# Patient Record
Sex: FEMALE | Race: ASIAN | NOT HISPANIC OR LATINO
[De-identification: names, ages, dates, MRNs, and addresses within clinical notes are randomized per-mention and may not be internally consistent; named-entity substitution may affect disease eponyms.]

---

## 2023-06-17 ENCOUNTER — TRANSCRIPTION ENCOUNTER (OUTPATIENT)
Age: 18
End: 2023-06-17

## 2023-06-18 ENCOUNTER — INPATIENT (INPATIENT)
Facility: HOSPITAL | Age: 18
LOS: 3 days | Discharge: HOME | End: 2023-06-22
Attending: SPECIALIST | Admitting: SPECIALIST
Payer: COMMERCIAL

## 2023-06-18 ENCOUNTER — TRANSCRIPTION ENCOUNTER (OUTPATIENT)
Age: 18
End: 2023-06-18

## 2023-06-18 ENCOUNTER — EMERGENCY (EMERGENCY)
Facility: HOSPITAL | Age: 18
LOS: 1 days | Discharge: STILL ADMITTED OR ADMINISTRATIVE | End: 2023-06-18
Admitting: EMERGENCY MEDICINE
Payer: MEDICAID

## 2023-06-18 VITALS
RESPIRATION RATE: 17 BRPM | HEART RATE: 80 BPM | OXYGEN SATURATION: 100 % | DIASTOLIC BLOOD PRESSURE: 55 MMHG | SYSTOLIC BLOOD PRESSURE: 102 MMHG

## 2023-06-18 VITALS — SYSTOLIC BLOOD PRESSURE: 114 MMHG | HEART RATE: 99 BPM | DIASTOLIC BLOOD PRESSURE: 71 MMHG

## 2023-06-18 DIAGNOSIS — O26.899 OTHER SPECIFIED PREGNANCY RELATED CONDITIONS, UNSPECIFIED TRIMESTER: ICD-10-CM

## 2023-06-18 LAB
ALBUMIN SERPL ELPH-MCNC: 3.8 G/DL — SIGNIFICANT CHANGE UP (ref 3.3–5)
ALP SERPL-CCNC: 99 U/L — SIGNIFICANT CHANGE UP (ref 40–120)
ALT FLD-CCNC: 10 U/L — SIGNIFICANT CHANGE UP (ref 4–33)
ANION GAP SERPL CALC-SCNC: 12 MMOL/L — SIGNIFICANT CHANGE UP (ref 7–14)
APTT BLD: 28.6 SEC — SIGNIFICANT CHANGE UP (ref 27–36.3)
AST SERPL-CCNC: 13 U/L — SIGNIFICANT CHANGE UP (ref 4–32)
BASOPHILS # BLD AUTO: 0.07 K/UL — SIGNIFICANT CHANGE UP (ref 0–0.2)
BASOPHILS NFR BLD AUTO: 0.5 % — SIGNIFICANT CHANGE UP (ref 0–2)
BILIRUB SERPL-MCNC: <0.2 MG/DL — SIGNIFICANT CHANGE UP (ref 0.2–1.2)
BLD GP AB SCN SERPL QL: NEGATIVE — SIGNIFICANT CHANGE UP
BUN SERPL-MCNC: 4 MG/DL — LOW (ref 7–23)
CALCIUM SERPL-MCNC: 9.1 MG/DL — SIGNIFICANT CHANGE UP (ref 8.4–10.5)
CHLORIDE SERPL-SCNC: 102 MMOL/L — SIGNIFICANT CHANGE UP (ref 98–107)
CO2 SERPL-SCNC: 21 MMOL/L — LOW (ref 22–31)
CREAT SERPL-MCNC: 0.53 MG/DL — SIGNIFICANT CHANGE UP (ref 0.5–1.3)
EGFR: 136 ML/MIN/1.73M2 — SIGNIFICANT CHANGE UP
EOSINOPHIL # BLD AUTO: 0.1 K/UL — SIGNIFICANT CHANGE UP (ref 0–0.5)
EOSINOPHIL NFR BLD AUTO: 0.7 % — SIGNIFICANT CHANGE UP (ref 0–6)
FIBRINOGEN PPP-MCNC: 806 MG/DL — HIGH (ref 200–465)
GLUCOSE SERPL-MCNC: 99 MG/DL — SIGNIFICANT CHANGE UP (ref 70–99)
HCT VFR BLD CALC: 36.2 % — SIGNIFICANT CHANGE UP (ref 34.5–45)
HGB BLD-MCNC: 11.8 G/DL — SIGNIFICANT CHANGE UP (ref 11.5–15.5)
IANC: 10.33 K/UL — HIGH (ref 1.8–7.4)
IMM GRANULOCYTES NFR BLD AUTO: 1.4 % — HIGH (ref 0–0.9)
INR BLD: 0.94 RATIO — SIGNIFICANT CHANGE UP (ref 0.88–1.16)
LDH SERPL L TO P-CCNC: 136 U/L — SIGNIFICANT CHANGE UP (ref 135–225)
LYMPHOCYTES # BLD AUTO: 16 % — SIGNIFICANT CHANGE UP (ref 13–44)
LYMPHOCYTES # BLD AUTO: 2.23 K/UL — SIGNIFICANT CHANGE UP (ref 1–3.3)
MCHC RBC-ENTMCNC: 25.3 PG — LOW (ref 27–34)
MCHC RBC-ENTMCNC: 32.6 GM/DL — SIGNIFICANT CHANGE UP (ref 32–36)
MCV RBC AUTO: 77.7 FL — LOW (ref 80–100)
MONOCYTES # BLD AUTO: 1 K/UL — HIGH (ref 0–0.9)
MONOCYTES NFR BLD AUTO: 7.2 % — SIGNIFICANT CHANGE UP (ref 2–14)
NEUTROPHILS # BLD AUTO: 10.33 K/UL — HIGH (ref 1.8–7.4)
NEUTROPHILS NFR BLD AUTO: 74.2 % — SIGNIFICANT CHANGE UP (ref 43–77)
NRBC # BLD: 0 /100 WBCS — SIGNIFICANT CHANGE UP (ref 0–0)
NRBC # FLD: 0 K/UL — SIGNIFICANT CHANGE UP (ref 0–0)
PLATELET # BLD AUTO: 269 K/UL — SIGNIFICANT CHANGE UP (ref 150–400)
POTASSIUM SERPL-MCNC: 3.2 MMOL/L — LOW (ref 3.5–5.3)
POTASSIUM SERPL-SCNC: 3.2 MMOL/L — LOW (ref 3.5–5.3)
PROT SERPL-MCNC: 7.6 G/DL — SIGNIFICANT CHANGE UP (ref 6–8.3)
PROTHROM AB SERPL-ACNC: 10.9 SEC — SIGNIFICANT CHANGE UP (ref 10.5–13.4)
RBC # BLD: 4.66 M/UL — SIGNIFICANT CHANGE UP (ref 3.8–5.2)
RBC # FLD: 14.2 % — SIGNIFICANT CHANGE UP (ref 10.3–14.5)
RH IG SCN BLD-IMP: NEGATIVE — SIGNIFICANT CHANGE UP
RH IG SCN BLD-IMP: NEGATIVE — SIGNIFICANT CHANGE UP
SODIUM SERPL-SCNC: 135 MMOL/L — SIGNIFICANT CHANGE UP (ref 135–145)
URATE SERPL-MCNC: 3 MG/DL — SIGNIFICANT CHANGE UP (ref 2.5–7)
WBC # BLD: 13.92 K/UL — HIGH (ref 3.8–10.5)
WBC # FLD AUTO: 13.92 K/UL — HIGH (ref 3.8–10.5)

## 2023-06-18 PROCEDURE — L9996: CPT

## 2023-06-18 PROCEDURE — 49320 DIAG LAPARO SEPARATE PROC: CPT

## 2023-06-18 PROCEDURE — 76856 US EXAM PELVIC COMPLETE: CPT | Mod: 26

## 2023-06-18 PROCEDURE — 76770 US EXAM ABDO BACK WALL COMP: CPT | Mod: 26

## 2023-06-18 PROCEDURE — 76705 ECHO EXAM OF ABDOMEN: CPT | Mod: 26

## 2023-06-18 PROCEDURE — 99223 1ST HOSP IP/OBS HIGH 75: CPT | Mod: GC

## 2023-06-18 PROCEDURE — 99223 1ST HOSP IP/OBS HIGH 75: CPT

## 2023-06-18 RX ORDER — HYDROMORPHONE HYDROCHLORIDE 2 MG/ML
0.5 INJECTION INTRAMUSCULAR; INTRAVENOUS; SUBCUTANEOUS
Refills: 0 | Status: DISCONTINUED | OUTPATIENT
Start: 2023-06-18 | End: 2023-06-18

## 2023-06-18 RX ORDER — SODIUM CHLORIDE 9 MG/ML
1000 INJECTION, SOLUTION INTRAVENOUS
Refills: 0 | Status: DISCONTINUED | OUTPATIENT
Start: 2023-06-18 | End: 2023-06-18

## 2023-06-18 RX ORDER — ACETAMINOPHEN 500 MG
1000 TABLET ORAL EVERY 6 HOURS
Refills: 0 | Status: DISCONTINUED | OUTPATIENT
Start: 2023-06-18 | End: 2023-06-19

## 2023-06-18 RX ORDER — MORPHINE SULFATE 50 MG/1
2 CAPSULE, EXTENDED RELEASE ORAL ONCE
Refills: 0 | Status: DISCONTINUED | OUTPATIENT
Start: 2023-06-18 | End: 2023-06-18

## 2023-06-18 RX ORDER — OXYTOCIN 10 UNIT/ML
333.33 VIAL (ML) INJECTION
Qty: 20 | Refills: 0 | Status: DISCONTINUED | OUTPATIENT
Start: 2023-06-18 | End: 2023-06-18

## 2023-06-18 RX ORDER — ACETAMINOPHEN 500 MG
1000 TABLET ORAL ONCE
Refills: 0 | Status: DISCONTINUED | OUTPATIENT
Start: 2023-06-19 | End: 2023-06-19

## 2023-06-18 RX ORDER — ACETAMINOPHEN 500 MG
1000 TABLET ORAL ONCE
Refills: 0 | Status: DISCONTINUED | OUTPATIENT
Start: 2023-06-18 | End: 2023-06-18

## 2023-06-18 RX ORDER — ONDANSETRON 8 MG/1
4 TABLET, FILM COATED ORAL ONCE
Refills: 0 | Status: DISCONTINUED | OUTPATIENT
Start: 2023-06-18 | End: 2023-06-18

## 2023-06-18 RX ORDER — PIPERACILLIN AND TAZOBACTAM 4; .5 G/20ML; G/20ML
3.38 INJECTION, POWDER, LYOPHILIZED, FOR SOLUTION INTRAVENOUS EVERY 8 HOURS
Refills: 0 | Status: DISCONTINUED | OUTPATIENT
Start: 2023-06-18 | End: 2023-06-22

## 2023-06-18 RX ORDER — ACETAMINOPHEN 500 MG
1000 TABLET ORAL ONCE
Refills: 0 | Status: COMPLETED | OUTPATIENT
Start: 2023-06-18 | End: 2023-06-18

## 2023-06-18 RX ORDER — CHLORHEXIDINE GLUCONATE 213 G/1000ML
1 SOLUTION TOPICAL DAILY
Refills: 0 | Status: DISCONTINUED | OUTPATIENT
Start: 2023-06-18 | End: 2023-06-18

## 2023-06-18 RX ORDER — AZITHROMYCIN 500 MG/1
500 TABLET, FILM COATED ORAL ONCE
Refills: 0 | Status: COMPLETED | OUTPATIENT
Start: 2023-06-18 | End: 2023-06-18

## 2023-06-18 RX ORDER — ACETAMINOPHEN 500 MG
650 TABLET ORAL ONCE
Refills: 0 | Status: COMPLETED | OUTPATIENT
Start: 2023-06-18 | End: 2023-06-18

## 2023-06-18 RX ORDER — SODIUM CHLORIDE 9 MG/ML
1000 INJECTION, SOLUTION INTRAVENOUS
Refills: 0 | Status: DISCONTINUED | OUTPATIENT
Start: 2023-06-18 | End: 2023-06-21

## 2023-06-18 RX ORDER — MORPHINE SULFATE 50 MG/1
4 CAPSULE, EXTENDED RELEASE ORAL ONCE
Refills: 0 | Status: DISCONTINUED | OUTPATIENT
Start: 2023-06-18 | End: 2023-06-18

## 2023-06-18 RX ORDER — HEPARIN SODIUM 5000 [USP'U]/ML
5000 INJECTION INTRAVENOUS; SUBCUTANEOUS EVERY 8 HOURS
Refills: 0 | Status: DISCONTINUED | OUTPATIENT
Start: 2023-06-18 | End: 2023-06-19

## 2023-06-18 RX ORDER — CHLORHEXIDINE GLUCONATE 213 G/1000ML
1 SOLUTION TOPICAL DAILY
Refills: 0 | Status: DISCONTINUED | OUTPATIENT
Start: 2023-06-18 | End: 2023-06-22

## 2023-06-18 RX ORDER — AZITHROMYCIN 500 MG/1
1000 TABLET, FILM COATED ORAL ONCE
Refills: 0 | Status: DISCONTINUED | OUTPATIENT
Start: 2023-06-18 | End: 2023-06-18

## 2023-06-18 RX ADMIN — HEPARIN SODIUM 5000 UNIT(S): 5000 INJECTION INTRAVENOUS; SUBCUTANEOUS at 18:33

## 2023-06-18 RX ADMIN — MORPHINE SULFATE 4 MILLIGRAM(S): 50 CAPSULE, EXTENDED RELEASE ORAL at 05:12

## 2023-06-18 RX ADMIN — SODIUM CHLORIDE 125 MILLILITER(S): 9 INJECTION, SOLUTION INTRAVENOUS at 10:04

## 2023-06-18 RX ADMIN — Medication 400 MILLIGRAM(S): at 19:30

## 2023-06-18 RX ADMIN — SODIUM CHLORIDE 125 MILLILITER(S): 9 INJECTION, SOLUTION INTRAVENOUS at 13:59

## 2023-06-18 RX ADMIN — MORPHINE SULFATE 4 MILLIGRAM(S): 50 CAPSULE, EXTENDED RELEASE ORAL at 06:00

## 2023-06-18 RX ADMIN — AZITHROMYCIN 255 MILLIGRAM(S): 500 TABLET, FILM COATED ORAL at 14:11

## 2023-06-18 RX ADMIN — MORPHINE SULFATE 2 MILLIGRAM(S): 50 CAPSULE, EXTENDED RELEASE ORAL at 13:58

## 2023-06-18 RX ADMIN — MORPHINE SULFATE 2 MILLIGRAM(S): 50 CAPSULE, EXTENDED RELEASE ORAL at 22:10

## 2023-06-18 RX ADMIN — Medication 400 MILLIGRAM(S): at 10:04

## 2023-06-18 RX ADMIN — MORPHINE SULFATE 2 MILLIGRAM(S): 50 CAPSULE, EXTENDED RELEASE ORAL at 02:18

## 2023-06-18 RX ADMIN — Medication 1000 MILLIGRAM(S): at 10:30

## 2023-06-18 RX ADMIN — MORPHINE SULFATE 2 MILLIGRAM(S): 50 CAPSULE, EXTENDED RELEASE ORAL at 21:57

## 2023-06-18 RX ADMIN — MORPHINE SULFATE 2 MILLIGRAM(S): 50 CAPSULE, EXTENDED RELEASE ORAL at 14:28

## 2023-06-18 RX ADMIN — Medication 1000 MILLIGRAM(S): at 19:40

## 2023-06-18 RX ADMIN — PIPERACILLIN AND TAZOBACTAM 25 GRAM(S): 4; .5 INJECTION, POWDER, LYOPHILIZED, FOR SOLUTION INTRAVENOUS at 15:31

## 2023-06-18 RX ADMIN — MORPHINE SULFATE 2 MILLIGRAM(S): 50 CAPSULE, EXTENDED RELEASE ORAL at 02:48

## 2023-06-18 NOTE — ED ADULT NURSE NOTE - CHIEF COMPLAINT QUOTE
alert oriented 20 weeks pregnant c/o severe abd pain no bleeding or vaginal discharge spoke  to Socrates  sent to L and D with EMS and ED tech 1

## 2023-06-18 NOTE — OB PROVIDER H&P - NSHPPHYSICALEXAM_GEN_ALL_CORE
Vital Signs Last 24 Hrs  T(C): 36.8 (18 Jun 2023 01:52), Max: 36.8 (18 Jun 2023 01:52)  T(F): 98.2 (18 Jun 2023 01:52), Max: 98.2 (18 Jun 2023 01:52)  HR: 91 (18 Jun 2023 02:18) (80 - 99)  BP: 125/76 (18 Jun 2023 02:18) (102/55 - 125/76)  RR: 20 (18 Jun 2023 01:52) (17 - 20)  SpO2: 100% (18 Jun 2023 02:18) (100% - 100%)    Assessment reveals VSS  General: Female in apparent distress in the stretcher, screaming   Neuro: No facial asymmetry, no slurred speech, moves all 4 extremities  Mood: Alert and lucid, appropriate mood and affect  A&Ox3  Lungs- clear bilateral  Heart- normal rate and regular rhythm  Extremities- Warm, Dry, no edema present, good pulses   Abdomen, hard tender, gravid   Cat 1 tracing, no ctx  Transabdominal Ultrasound- breech, good fetal movement, good fluid around the baby  Sterile Speculum- cervix appears closed  Transvaginal Ultrasound- cervical length 2.6-3.3cm, no funneling or dynamic changes   Vaginal Exam- deferred       PLAN:  Admit for abdominal pain Vital Signs Last 24 Hrs  T(C): 36.8 (18 Jun 2023 01:52), Max: 36.8 (18 Jun 2023 01:52)  T(F): 98.2 (18 Jun 2023 01:52), Max: 98.2 (18 Jun 2023 01:52)  HR: 91 (18 Jun 2023 02:18) (80 - 99)  BP: 125/76 (18 Jun 2023 02:18) (102/55 - 125/76)  RR: 20 (18 Jun 2023 01:52) (17 - 20)  SpO2: 100% (18 Jun 2023 02:18) (100% - 100%)    Assessment reveals VSS  General: Female in apparent distress in the stretcher, screaming   Neuro: No facial asymmetry, no slurred speech, moves all 4 extremities  Mood: Alert and lucid, appropriate mood and affect  A&Ox3  Lungs- clear bilateral  Heart- normal rate and regular rhythm  Extremities- Warm, Dry, no edema present, good pulses   Abdomen, hard tender, gravid   Cat 1 tracing, no ctx  Transabdominal Ultrasound- breech, good fetal movement, good fluid around the baby  Sterile Speculum- cervix appears closed  Transvaginal Ultrasound- cervical length 2.6-3.3cm, no funneling or dynamic changes   Vaginal Exam- closed       PLAN:  Admit for abdominal pain

## 2023-06-18 NOTE — CONSULT NOTE PEDS - SUBJECTIVE AND OBJECTIVE BOX
Ms. Torres is a 19 y/o  admitted at 23w2d gestational age. Maternal history notable for ____, and prenatal history notable for ______. Most recent EFW ___g on ______. NICU consulted to discuss what to expect if she were to deliver at 23 weeks gestation. NICU team met with Ms. _______ and her partner on L&D and discussed the followin. 23 weeks is considered the lower limit of viability, and parents may opt to pursue either comfort care or a full resuscitation should she go on to deliver at this time. At 23 weeks, baby will likely have a heart rate after delivery. If parents elect for comfort care, the infant will be warmed, swaddled, and would be able to stay in the room with parents and be held if they wish.    2. If parents opt for full resuscitative efforts, the NICU team will be present at the time of delivery, and the infant will be placed under the warmer and immediately evaluated. The survival rate for infants born at 23 weeks gestation at this center is approximately 50%.    3. Due to severe prematurity the infant will need respiratory support, either in the form of CPAP or intubation with mechanical ventilation. If the infant requires intubation, surfactant would likely be administered immediately at delivery or soon after. Due to prematurity, the infant may develop chronic lung disease.    4. The infant is at significantly increased risk for intraventricular hemorrhage, which may result in severe developmental delays. Even in the absence of IVH the infant is at risk for developmental delays as a consequence of prematurity. The infant would be followed by a developmental pediatrician to monitor for neurodevelopmental delays.    5. Due to extreme prematurity, full feedings will not be started right away. The infant would require placement of an orogastric or nasogastric tube to provide enteral feedings, and feeding volume would be advanced slowly as tolerated. IV nutrition in the form of TPN would be provided via umbilical line or PICC until the infant is able to tolerate full enteral feedings. Discussed the benefits of human milk for  infants and encouraged mother to pump following delivery.    6. Due to the extreme prematurity, the infant would be at increased risk for infection and would likely be started on antibiotics after birth. The infant will be screened for infections following delivery and may require other courses of antibiotics during their hospital course if an infection were suspected.    7. The infant would also require screening for a patent ductus arteriosus, and if clinically significant, may require medical or procedural treatment.    8. Due to immature thermoregulation, infant will be placed in an isolette until able to maintain body temperature appropriately.    9. In addition to the above-mentioned issues, the infant would also be at risk for vision problems (retinopathy of prematurity), anemia of prematurity, and jaundice.    Ms. _______ and her partner had the chance to ask any questions and were encouraged to contact the NICU at that time if additional questions arise.    Thank you for the opportunity to participate in the care of this patient and please inform us of any changes in her status.  Ms. Torres is a 19 y/o  admitted at 23w2d gestational age with severe abdominal pain, found to have a large ovarian cyst and intraabdominal pus on diagnostic laparoscopy. Ovarian cyst was not accessible during diagnostic laparoscopy due to pregnancy. Patient started on antibiotics with ID consultation, and discussed possible IR drainage of ovarian cyst. Maternal and prenatal history otherwise unremarkable. Most recent EFW 540g on 23. NICU consulted to discuss what to expect if she were to deliver at 23 weeks gestation. NICU team met with Ms. Torres and her partner on L&D and discussed the followin. 23 weeks is considered the lower limit of viability, and parents may opt to pursue either comfort care or a full resuscitation should she go on to deliver at this time. At 23 weeks, baby will likely have a heart rate after delivery. If parents elect for comfort care, the infant will be warmed, swaddled, and would be able to stay in the room with parents and be held if they wish.    2. If parents opt for full resuscitative efforts, the NICU team will be present at the time of delivery, and the infant will be placed under the warmer and immediately evaluated. The survival rate for infants born at 23 weeks gestation at this center is approximately 50%.    3. Due to severe prematurity the infant will need respiratory support, either in the form of CPAP or intubation with mechanical ventilation. If the infant requires intubation, surfactant would likely be administered immediately at delivery or soon after. Due to prematurity, the infant may develop chronic lung disease.    4. The infant is at significantly increased risk for intraventricular hemorrhage, which may result in severe developmental delays. Even in the absence of IVH the infant is at risk for developmental delays as a consequence of prematurity. The infant would be followed by a developmental pediatrician to monitor for neurodevelopmental delays.    5. Due to extreme prematurity, full feedings will not be started right away. The infant would require placement of an orogastric or nasogastric tube to provide enteral feedings, and feeding volume would be advanced slowly as tolerated. IV nutrition in the form of TPN would be provided via umbilical line or PICC until the infant is able to tolerate full enteral feedings. Discussed the benefits of human milk for  infants and encouraged mother to pump following delivery.    6. Due to the extreme prematurity, the infant would be at increased risk for infection and would likely be started on antibiotics after birth. The infant will be screened for infections following delivery and may require other courses of antibiotics during their hospital course if an infection were suspected.    7. The infant would also require screening for a patent ductus arteriosus, and if clinically significant, may require medical or procedural treatment.    8. Due to immature thermoregulation, infant will be placed in an isolette until able to maintain body temperature appropriately.    9. In addition to the above-mentioned issues, the infant would also be at risk for vision problems (retinopathy of prematurity), anemia of prematurity, and jaundice.    Ms. Torres and her partner had the chance to ask any questions and were encouraged to contact the NICU at that time if additional questions arise.    Thank you for the opportunity to participate in the care of this patient and please inform us of any changes in her status.

## 2023-06-18 NOTE — CONSULT NOTE ADULT - SUBJECTIVE AND OBJECTIVE BOX
Vascular & Interventional Radiology    HPI: 18y Female with _______    Allergies: No Known Allergies    Medications (Abx/Cardiac/Anticoagulation/Blood Products)      Data:  157.5  43.1  T(C): 36.7  HR: 59  BP: 111/66  RR: 18  SpO2: 99%    -WBC 13.92 / HgB 11.8 / Hct 36.2 / Plt 269  -Na 135 / Cl 102 / BUN 4 / Glucose 99  -K 3.2 / CO2 21 / Cr 0.53  -ALT 10 / Alk Phos 99 / T.Bili <0.2  -INR0.94    Imaging: _______    Assessment:   18y Female with_______    Plan:   - No plan for drainage at this time. Full note to follow. Vascular & Interventional Radiology    HPI: 18y Female  at 23.2 wks gestation presented on  with severe lower abdominal pain more on the left side. US abdomen showed Simple 5.3 cm left ovarian cyst. S/p diagnostic laparoscopy on  with diffuse purulence over anterior abdominal wall and anterior uterine wall. Adhesions present with purulence over right round ligament, no torsions s/p washout and cultures sent.  Evaluated by surgery intraop with Normal appendix, small bowel, gallbladder, stomach/pylorus and visible portion of colon Patient received Zosyn. IR consulted for management of ovarian cyst / concern for ovarian abscess.    Allergies: No Known Allergies    Data:  T(C): 36.7  HR: 59  BP: 111/66  RR: 18  SpO2: 99%    -WBC 13.92 / HgB 11.8 / Hct 36.2 / Plt 269  -Na 135 / Cl 102 / BUN 4 / Glucose 99  -K 3.2 / CO2 21 / Cr 0.53  -ALT 10 / Alk Phos 99 / T.Bili <0.2  -INR0.94    Assessment:   18y Female  at 23.2 wks gestation p/w severe abd pain on L side s/p diagnostic laparoscopy on  with diffuse purulence over anterior abdominal wall and anterior uterine wall. Adhesions present with purulence over right round ligament, no torsions s/p washout and cultures sent. Per ID, overall concern is for PID/TOA. On US, the cyst appears simple.     Plan:   - No plan for drainage at this time. The cyst appears simple and there is low concern for abscess on imaging. The L ovary and fallopian tube were not fully visualized during laparoscopy per the brief op note.  - Would trial abx first. If patient does not improve/worsens, would repeat US.  - Page as needed.      --  Oswald Cesar M.D.  Vascular and Interventional Radiology  Available on [employer] Teams    - Nonemergent consults:  [facility] order "Consult- Interventional Radiology"  - Emergent issues (pager): [facility] [phone]; [facility] [phone]; [phone]  - Scheduling questions: [facility] [phone]; [facility] [phone]  - Clinic/outpatient booking: [facility] [phone]; [facility] [phone]

## 2023-06-18 NOTE — PROGRESS NOTE ADULT - ASSESSMENT
A/P: 17yo  @ 23w2d who is now s/p diagnostic laparoscopy for presumed ovarian torsion found to have intraabdominal pus and cyst in the posterior culdusac adherent and non-accessible given pregnancy. Patient with peritoneal abdomen on exam. She was counseled about intraop findings and concern for pelvic infection. We discussed that at this time we do not have a definitive source of the infection, however it is possibly from this ovarian cyst. Patient counseled on need for IV antibiotics in the setting of the pus found intraop. Will consult ID for help with antibiotic coverage. Additionally, discussed the possible need for IR drainage of ovarian cyst, will consult IR to discuss. Patient counseled on risk of  labor in the setting of intraabdominal infection and risk of possible intrauterine infection. At this time there are no signs of fetal destress and fetal heart rate monitoring has been reassuring. We discussed that 23 weeks is a viable pregancy and we will have NICU come to discuss outcomes of pregnancies delivered at 23 weeks. At this time there are no indications or signs of imminent delivery, however prior to proceeding with further fetal monitoring we would like the patient to be informed by NICU. Patient was counseled that if any signs of fetal distress on monitoring imminent delivery would be via classical  section which has risks.  Following NICU consultation we will re-discuss whether patient would want a classical  section in the setting of fetal distress. Patient verbalized understanding and plan thus far. Will continue to monitor for signs of infection and treat pain as needed.   Recommendations as follows:   - NICU consultation   - Continue azithromycin and zosyn at this time with plan to defer antibiotic regimen to ID recommendations   - IR consultation for possible drainage of ovarian abscess   - Fetal monitoring pending patient preference following NICU consultaiton Carly Hirschberg, MFM Fellow   Patient seen and examined with RAMA Collins Attending

## 2023-06-18 NOTE — CHART NOTE - NSCHARTNOTEFT_GEN_A_CORE
R3 Antepartum Chart Note--Re: Fetal Monitoring    Pt is s/p discussion with NICU regarding prognosis of walt-viable fetus if fetus was to be delivered @23wks. Pt understands the risks associated with delivery of the fetus at this gestational age. Discussed the option of monitoring fetus at this time, with the understanding that if fetal heart tracing concerning, there is a possibility of delivery via emergency classical c/s. Discussed with patient the risks of CCS including but not limited to all future deliveries requiring early C/S btwn 36-37wks. In setting of the above information & further discussion, patient and her partner do not wish to proceed with fetal heart monitoring with NSTs at this time. They are interested in daily fetal heart rate checks. Discussed that this can be a fluid decision that can and will be readdressed throughout pts hospital stay. All questions answered.    Rimma, PGY3

## 2023-06-18 NOTE — BRIEF OPERATIVE NOTE - COMMENTS
See OB/GYN note for further details  Recommend CT with IV contrast if no gynecological source found on laparoscopy.
Dictation #[id number]

## 2023-06-18 NOTE — CHART NOTE - NSCHARTNOTEFT_GEN_A_CORE
R3 Chart Note    Pt seen and examined for severe pain.   Pain partially relieved with morphine  PE: RLQ/flank pain  VE: closed    Pt to have a UA/Ucx  Sent to radiology for a TVUS to visualize ovarian cyst and renal and pelvic US    Leanne PGY3   d/w Dr. Garcia

## 2023-06-18 NOTE — CONSULT NOTE ADULT - ATTENDING COMMENTS
18F 23.2 weeks gestation presenting with severe L sided abd pain, US with L ovarian cyst, s/p OR on 6/18 for diagnostic lap found to have diffuse purulence over anterior abd wall and anterior uterine wall. Evaluated by surgery intraop and with normal appendix, small bowel, gallbladder, stomach/ pylorus and visible portion of colon. Leukocytosis. No fevers.    Recommend:  #Abd pain concerning for PID/ TOA  -F/U OR cultures  -Continue zosyn  -Azithromycin 1 gram IV x 1  -Monitor fever curve  -Trend WBC  -Check blood cultures x 2 sets  -HIV  -Urine gonorrhea/ chlamydia XU  -IR consulted, no plan for drainage    Frank Barrera MD  Available through [employer] Teams  If no response, or after 5pm/weekends, call [phone]

## 2023-06-18 NOTE — CONSULT NOTE ADULT - ASSESSMENT
18 year old F  at 23.2 wks gestation presented on  with severe lower abdominal pain more on the left side.  US abdomen showed Simple 5.3 cm left ovarian cyst.  S/p diagnostic laparoscopy on  with diffuse purulence over anterior abdominal wall and anterior uterine wall. Adhesions present with purulence over right round ligament, no torsions s/p washout and cultures sent.   Evaluated by surgery intraop with Normal appendix, small bowel, gallbladder, stomach/pylorus and visible portion of colon  Patient received Zosyn and ID consulted for further recommendations.     Afebrile  Leukocytosis 13.9K    PT TO BE SEEN. PRELIM NOTE  PENDING RECS. PLEASE WAIT FOR FINAL RECS AFTER DISCUSSION WITH ATTENDING#       18 year old F  at 23.2 wks gestation presented on  with severe lower abdominal pain more on the left side.  US abdomen showed Simple 5.3 cm left ovarian cyst.  S/p diagnostic laparoscopy on  with diffuse purulence over anterior abdominal wall and anterior uterine wall. Adhesions present with purulence over right round ligament, no torsions s/p washout and cultures sent.   Evaluated by surgery intraop with Normal appendix, small bowel, gallbladder, stomach/pylorus and visible portion of colon  Patient received Zosyn and ID consulted for further recommendations.     Afebrile  Leukocytosis 13.9K    #Abdominal pain with concern for PID/TOA s/p diagnostic laparoscopy with intraabdominal purulence s/p washout   #Leukocytosis     Recommendations:   S/p 1g Azithromycin   Continue Zosyn 3.375 g IV q 8hrs   Send Chlamydia/GC NAAT in urine   Send Blood Cx X2   F/up OR cultures sent   GYN and surgery following   IR consulted for possible ovarian abscess drainage     Seen and discussed with Dr Bruce Deutsch MD, PGY4  ID fellow  [employer] Preferred  After 5pm/weekends call [phone]           18 year old F  at 23.2 wks gestation presented on  with severe lower abdominal pain more on the left side.  US abdomen showed Simple 5.3 cm left ovarian cyst.  S/p diagnostic laparoscopy on  with diffuse purulence over anterior abdominal wall and anterior uterine wall. Adhesions present with purulence over right round ligament, no torsions s/p washout and cultures sent.   Evaluated by surgery intraop with Normal appendix, small bowel, gallbladder, stomach/pylorus and visible portion of colon  Patient received Zosyn and ID consulted for further recommendations.     Afebrile  Leukocytosis 13.9K    #Abdominal pain with concern for PID/TOA s/p diagnostic laparoscopy with intraabdominal purulence s/p washout   #Leukocytosis     Recommendations:   Azithromycin 1 g ordered by primary team  Continue Zosyn 3.375 g IV q 8hrs   Send Chlamydia/GC NAAT in urine   Send Blood Cx X2   F/up OR cultures sent   GYN and surgery following   IR consulted for possible ovarian abscess drainage   Check HIV screen     Seen and discussed with Dr Bruce Deutsch MD, PGY4  ID fellow  [employer] Teams Preferred  After 5pm/weekends call [phone]

## 2023-06-18 NOTE — BRIEF OPERATIVE NOTE - OPERATION/FINDINGS
Called intraoperatively for Intraabdominal pus.  Normal appendix, small bowel, gallbladder, stomach/pylorus and visible portion of colon. Gravid uterus precludes lower visualization.

## 2023-06-18 NOTE — OB PROVIDER H&P - PROBLEM SELECTOR PLAN 1
Admit for pelvic pain in pregnancy  ovarian cyst vs torsion?  D/W Dr. Garcia and Dr. Guillory  Routine Orders  Type and Screen x2  NPO  Morphine for pain management   Pelvic Ultrasound ordered Admit for pelvic pain in pregnancy  ovarian cyst vs torsion?  D/W Dr. Garcia and Dr. Guillory  Routine Orders  Type and Screen x2  NPO  Morphine for pain management   Pelvic Ultrasound ordered      Ob attending  pt evaluated and agree with above  Pt and partner state patient woke with right sided pain. States pain is coming in waves. Has known left ovarian cyst.   Denies fever/chills/nausea/vomiting, Has a good appetite - ate chipotle bowel.  Denies previous surgery, denies back pain.  On exam pain is noted to be fundal.  [place] readjusted as appears she may be ester.   Will send labs - check cbc, ua, need sono to assess ovarian cysts and rule out torsion. Will continue to monitor  consider exploratory laparoscopy  CARMEN Garcia MD

## 2023-06-18 NOTE — BRIEF OPERATIVE NOTE - OPERATION/FINDINGS
23cm gravid uterus. Grossly normal right adnexa. Left fallopian tube and portion of ovary visualized and grossly no signs of torsion.  Diffuse purulence over anterior abdominal wall and anterior uterine wall. Adhesions present with purulence over right round ligament.  Appendix, liver, gallbladder and bowel evaluated by  and no signs of infection or disease present.  Recto-vaginal exam with posterior cul-de-sac mass palpated and firm.  Postop  on US 23cm gravid uterus. Grossly normal right adnexa. Left fallopian tube and portion of ovary visualized and grossly no signs of torsion.  Pyoperitoneum noted, diffuse purulence over anterior abdominal wall and anterior uterine wall. Adhesions present with purulence over right round ligament.  Appendix, liver, gallbladder and bowel evaluated by  and no signs of infection or disease present.  Recto-vaginal exam with posterior cul-de-sac mass palpated and firm.  Postop  on US

## 2023-06-18 NOTE — CONSULT NOTE ADULT - SUBJECTIVE AND OBJECTIVE BOX
GENERAL SURGERY CONSULT NOTE  --------------------------------------------------------------------------------------------  HPI:  17y/o  @23.2wks presents via ambulance with severe lower abdominal pain more on the left side that woke her up at midnight from sleep. Pain scale 10/10. Reports good fetal movement. Patient is admitted and evaluated by OB team. Due to persistent severe abdominal pain , patient was taken to OR emergently  for dsc LSC, poss detorsion, cystectomy.    Surgery is consulted intraoperatively for intraabdominal pus.     ROS: 10-system review is otherwise negative except HPI above.      PAST MEDICAL & SURGICAL HISTORY:  No pertinent past medical history      No significant past surgical history        FAMILY HISTORY:    [] Family history not pertinent as reviewed with the patient and family    SOCIAL HISTORY:  unable to obtain due to clinical situation    ALLERGIES: No Known Allergies      HOME MEDICATIONS: unable to obtain due to clinical situation    CURRENT MEDICATIONS  MEDICATIONS (STANDING): acetaminophen   IVPB .. 1000 milliGRAM(s) IV Intermittent once  lactated ringers. 1000 milliLiter(s) IV Continuous <Continuous>  oxytocin Infusion 333.333 milliUNIT(s)/Min IV Continuous <Continuous>    MEDICATIONS (PRN):  --------------------------------------------------------------------------------------------    Vitals:   T(C): 36.9 (23 @ 05:28), Max: 36.9 (23 @ 03:14)  HR: 84 (23 @ 05:28) (77 - 99)  BP: 116/67 (23 @ 05:28) (102/55 - 125/76)  RR: 17 (23 @ 05:28) (17 - 20)  SpO2: 100% (23 @ 02:33) (100% - 100%)  CAPILLARY BLOOD GLUCOSE        CAPILLARY BLOOD GLUCOSE           @ 07:01  -   07:00  --------------------------------------------------------  IN:    Lactated Ringers: 375 mL  Total IN: 375 mL    OUT:  Total OUT: 0 mL    Total NET: 375 mL        Height (cm): 157.5 (:28)  Weight (kg): 43.1 (:)  BMI (kg/m2): 17.4 (:28)  BSA (m2): 1.39 (:)    PHYSICAL EXAM:   Paient is the OR, intubated and operation on going    --------------------------------------------------------------------------------------------    LABS  CBC ( 02:06)                              11.8                           13.92<H>  )----------------(  269        74.2  % Neutrophils, 16.0  % Lymphocytes, ANC: 10.33<H>                              36.2      BMP ( @ 02:06)             135     |  102     |  4<L>  		Ca++ --      Ca 9.1                ---------------------------------( 99    		Mg --                 3.2<L>  |  21<L>   |  0.53  			Ph --        LFTs ( @ 02:06)      TPro 7.6 / Alb 3.8 / TBili <0.2 / DBili -- / AST 13 / ALT 10 / AlkPhos 99    Coags ( @ 02:06)  aPTT 28.6 / INR 0.94 / PT 10.9          --------------------------------------------------------------------------------------------    MICROBIOLOGY      --------------------------------------------------------------------------------------------    IMAGING

## 2023-06-18 NOTE — BRIEF OPERATIVE NOTE - NSICDXBRIEFPOSTOP_GEN_ALL_CORE_FT
POST-OP DIAGNOSIS:  Pelvic inflammatory disease (PID) 18-Jun-2023 09:53:47  Андрей Donato   POST-OP DIAGNOSIS:  TOA (tubo-ovarian abscess) 19-Jun-2023 00:38:18  Franklyn Humphries

## 2023-06-18 NOTE — OB PROVIDER H&P - NS_OBGYNHISTORY_OBGYN_ALL_OB_FT
GYN: left side ovarian cyst diagnosed 2 months ago  OB: Denies      AP course uncomplicated  PNC with Dr. Barbara Palumbo

## 2023-06-18 NOTE — OB PROVIDER H&P - HISTORY OF PRESENT ILLNESS
19y/o  @23.2wks presents via ambulence with severe lower abdominal pain more on the left side that woke her up at midnight from sleep. Pain scale 10/10  Reports good fetal movement  Denies LOF/VB  PNC with Dr. Barbara Leonard     Allergies: Denies  Medications: PNV    Denies Medical and Surgical HX  Denies Etoh/Smoke/Drugs/Psy  17y/o  @23.2wks presents via ambulance with severe lower abdominal pain more on the left side that woke her up at midnight from sleep. Pain scale 10/10  Reports good fetal movement  Denies N/V/D/Fever/Chills  Denies LOF/VB  PNC with Dr. Barbara Leonard     Allergies: Denies  Medications: PNV    Denies Medical and Surgical HX  Denies Etoh/Smoke/Drugs/Psy

## 2023-06-18 NOTE — BRIEF OPERATIVE NOTE - NSICDXBRIEFPREOP_GEN_ALL_CORE_FT
PRE-OP DIAGNOSIS:  Abdominal pain 18-Jun-2023 09:00:40  Jose Leyva  
PRE-OP DIAGNOSIS:  Abdominal pain 18-Jun-2023 09:00:40  Jose Leyva

## 2023-06-18 NOTE — OB PROVIDER H&P - NSLOWPPHRISK_OBGYN_A_OB
No previous uterine incision/[place]add Pregnancy/Less than or equal to 4 previous vaginal births/No known bleeding disorder/No history of postpartum hemorrhage/No other PPH risks indicated

## 2023-06-18 NOTE — CONSULT NOTE ADULT - ASSESSMENT
19y/o  @23.2wks presents via ambulance with severe lower abdominal pain more on the left side that woke her up at midnight from sleep. Pain scale 10/10. Reports good fetal movement. Patient is admitted and evaluated by OB team. Due to persistent severe abdominal pain , patient was taken to OR emergently  for dsc LSC, poss detorsion, cystectomy.    Surgery is consulted intraoperatively for intraabdominal pus.     Plan:  - did diagnostic laparoscopy, referring to brief op notes for details    - rest of care per OB  - surgery will follow up  - patient saw by Sonido Bustamante PGY-2  B team Surgery  [id number]

## 2023-06-18 NOTE — ED ADULT TRIAGE NOTE - CHIEF COMPLAINT QUOTE
alert oriented 20 weeks pregnant c/o severe abd pain no bleeding or vaginal discharge spoke  to Socrates  sent to L and D with EMS and ED tech

## 2023-06-18 NOTE — CONSULT NOTE ADULT - SUBJECTIVE AND OBJECTIVE BOX
HPI:    18 year old F  at 23.2 wks gestation presented on  with severe lower abdominal pain more on the left side.  US abdomen showed Simple 5.3 cm left ovarian cyst.  S/p diagnostic laparoscopy on  with diffuse purulence over anterior abdominal wall and anterior uterine wall. Adhesions present with purulence over right round ligament, no torsions s/p washout and cultures sent.   Evaluated by surgery intraop with Normal appendix, small bowel, gallbladder, stomach/pylorus and visible portion of colon  Patient received Zosyn and ID consulted for further recommendations.     REVIEW OF SYSTEMS  [  ] ROS unobtainable because:    [  ] All other systems negative except as noted below    Constitutional:  [ ] fever [ ] chills  [ ] weight loss  [ ]night sweat  [ ]poor appetite/PO intake [ ]fatigue   Skin:  [ ] rash [ ] phlebitis	  Eyes: [ ] icterus [ ] pain  [ ] discharge	  ENMT: [ ] sore throat  [ ] thrush [ ] ulcers [ ] exudates [ ]anosmia  Respiratory: [ ] dyspnea [ ] hemoptysis [ ] cough [ ] sputum	  Cardiovascular:  [ ] chest pain [ ] palpitations [ ] edema	  Gastrointestinal:  [ ] nausea [ ] vomiting [ ] diarrhea [ ] constipation [ ] pain	  Genitourinary:  [ ] dysuria [ ] frequency [ ] hematuria [ ] discharge [ ] flank pain  [ ] incontinence  Musculoskeletal:  [ ] myalgias [ ] arthralgias [ ] arthritis  [ ] back pain  Neurological:  [ ] headache [ ] weakness [ ] seizures  [ ] confusion/altered mental status    prior hospital charts reviewed [V]  primary team notes reviewed [V]  other consultant notes reviewed [V]    PAST MEDICAL & SURGICAL HISTORY:  No pertinent past medical history    No significant past surgical history  SOCIAL HISTORY:  - Denied smoking/vaping/alcohol/recreational drug use    FAMILY HISTORY:    Allergies  No Known Allergies    ANTIMICROBIALS:  azithromycin  IVPB 1000 once  piperacillin/tazobactam IVPB.. 3.375 every 8 hours    ANTIMICROBIALS (past 90 days):  MEDICATIONS  (STANDING):    OTHER MEDS:   MEDICATIONS  (STANDING):  acetaminophen   IVPB .. 650 once  heparin   Injectable 5000 every 8 hours  HYDROmorphone  Injectable 0.5 every 10 minutes PRN  ondansetron Injectable 4 once PRN  oxytocin Infusion 333.333 <Continuous>    VITALS:  Vital Signs Last 24 Hrs  T(F): 97 (23 @ 09:40), Max: 98.42 (23 @ 03:14)    Vital Signs Last 24 Hrs  HR: 75 (23 @ 10:05) (75 - 99)  BP: 128/84 (23 @ 10:05) (102/55 - 128/84)  RR: 18 (23 @ 10:05)  SpO2: 100% (23 @ 10:05) (100% - 100%)  Wt(kg): --    EXAM:  Physical Exam:  Constitutional:  well preserved, comfortable  Head/Eyes: no icterus, PERRL, EOMI  ENT:  supple; no thrush  LUNGS:  CTA  CVS:  normal S1, S2, no murmur  Abd:  soft, non-tender; non-distended  Ext:  no edema  Vascular:  IV site no erythema tenderness or discharge  MSK:  joints without swelling  Neuro: AAO X 3, non- focal    Labs:                        11.8   13.92 )-----------( 269      ( 2023 02:06 )             36.2         135  |  102  |  4<L>  ----------------------------<  99  3.2<L>   |  21<L>  |  0.53    Ca    9.1      2023 02:06    TPro  7.6  /  Alb  3.8  /  TBili  <0.2  /  DBili  x   /  AST  13  /  ALT  10  /  AlkPhos  99  -    WBC Trend:  WBC Count: 13.92 (23 @ 02:06)    Auto Neutrophil #: 10.33 K/uL (23 @ 02:06)    Creatine Trend:  Creatinine, Serum: 0.53 ()    Liver Biochemical Testing Trend:  Alanine Aminotransferase (ALT/SGPT): 10 ()  Aspartate Aminotransferase (AST/SGOT): 13 (23 @ 02:06)  Bilirubin Total, Serum: <0.2 ()    Trend LDH  23 @ 02:06  136    Auto Eosinophil %: 0.7 % (23 @ 02:06)    MICROBIOLOGY    Lactate Dehydrogenase, Serum: 136 ()    RADIOLOGY:  imaging below personally reviewed   HPI:    18 year old F  at 23.2 wks gestation presented on  with severe lower abdominal pain more on the left side.  US abdomen showed Simple 5.3 cm left ovarian cyst.  S/p diagnostic laparoscopy on  with diffuse purulence over anterior abdominal wall and anterior uterine wall. Adhesions present with purulence over right round ligament, no torsions s/p washout and cultures sent.   Evaluated by surgery intraop with Normal appendix, small bowel, gallbladder, stomach/pylorus and visible portion of colon  Patient received Zosyn and ID consulted for further recommendations.     REVIEW OF SYSTEMS  [  ] ROS unobtainable because:    [X] All other systems negative except as noted below    Constitutional:  [ ] fever [ ] chills  [ ] weight loss  [ ]night sweat  [ ]poor appetite/PO intake [ ]fatigue   Skin:  [ ] rash [ ] phlebitis	  Eyes: [ ] icterus [ ] pain  [ ] discharge	  ENMT: [ ] sore throat  [ ] thrush [ ] ulcers [ ] exudates [ ]anosmia  Respiratory: [ ] dyspnea [ ] hemoptysis [ ] cough [ ] sputum	  Cardiovascular:  [ ] chest pain [ ] palpitations [ ] edema	  Gastrointestinal:  [ ] nausea [ ] vomiting [ ] diarrhea [ ] constipation [X pain	  Genitourinary:  [ ] dysuria [ ] frequency [ ] hematuria [ ] discharge [ ] flank pain  [ ] incontinence  Musculoskeletal:  [ ] myalgias [ ] arthralgias [ ] arthritis  [ ] back pain  Neurological:  [ ] headache [ ] weakness [ ] seizures  [ ] confusion/altered mental status    prior hospital charts reviewed [V]  primary team notes reviewed [V]  other consultant notes reviewed [V]    PAST MEDICAL & SURGICAL HISTORY:  No pertinent past medical history    No significant past surgical history  SOCIAL HISTORY:  - Denied smoking/vaping/alcohol/recreational drug use    FAMILY HISTORY:    Allergies  No Known Allergies    ANTIMICROBIALS:  azithromycin  IVPB 1000 once  piperacillin/tazobactam IVPB.. 3.375 every 8 hours    ANTIMICROBIALS (past 90 days):  MEDICATIONS  (STANDING):    OTHER MEDS:   MEDICATIONS  (STANDING):  acetaminophen   IVPB .. 650 once  heparin   Injectable 5000 every 8 hours  HYDROmorphone  Injectable 0.5 every 10 minutes PRN  ondansetron Injectable 4 once PRN  oxytocin Infusion 333.333 <Continuous>    VITALS:  Vital Signs Last 24 Hrs  T(F): 97 (23 @ 09:40), Max: 98.42 (23 @ 03:14)    Vital Signs Last 24 Hrs  HR: 75 (23 @ 10:05) (75 - 99)  BP: 128/84 (23 @ 10:05) (102/55 - 128/84)  RR: 18 (23 @ 10:05)  SpO2: 100% (23 @ 10:05) (100% - 100%)  Wt(kg): --    EXAM:  Physical Exam:  Constitutional:  well preserved, comfortable  Head/Eyes: no icterus, PERRL, EOMI  ENT:  supple; no thrush  LUNGS:  CTA  CVS:  normal S1, S2, no murmur  Abd:  soft, non-tender; pregnant, laparoscopy incision site with no discharge or surrounding erythema   Ext:  no edema  Vascular:  IV site no erythema tenderness or discharge  MSK:  joints without swelling  Neuro: AAO X 3, non- focal    Labs:                        11.8   13.92 )-----------( 269      ( 2023 02:06 )             36.2         135  |  102  |  4<L>  ----------------------------<  99  3.2<L>   |  21<L>  |  0.53    Ca    9.1      2023 02:06    TPro  7.6  /  Alb  3.8  /  TBili  <0.2  /  DBili  x   /  AST  13  /  ALT  10  /  AlkPhos  99      WBC Trend:  WBC Count: 13.92 (23 @ 02:06)    Auto Neutrophil #: 10.33 K/uL (23 @ 02:06)    Creatine Trend:  Creatinine, Serum: 0.53 ()    Liver Biochemical Testing Trend:  Alanine Aminotransferase (ALT/SGPT): 10 ()  Aspartate Aminotransferase (AST/SGOT): 13 (23 @ 02:06)  Bilirubin Total, Serum: <0.2 ()    Trend LDH  23 @ 02:06  136    Auto Eosinophil %: 0.7 % (23 @ 02:06)    MICROBIOLOGY    Lactate Dehydrogenase, Serum: 136 ()    RADIOLOGY:  imaging below personally reviewed   HPI:  18 year old F  at 23.2 wks gestation presented on  with severe lower abdominal pain more on the left side.  US abdomen showed Simple 5.3 cm left ovarian cyst.  S/p diagnostic laparoscopy on  with diffuse purulence over anterior abdominal wall and anterior uterine wall. Adhesions present with purulence over right round ligament, no torsions s/p washout and cultures sent.   Evaluated by surgery intraop with Normal appendix, small bowel, gallbladder, stomach/pylorus and visible portion of colon  Patient received Zosyn and ID consulted for further recommendations.     REVIEW OF SYSTEMS  [  ] ROS unobtainable because:    [X] All other systems negative except as noted below    Constitutional:  [ ] fever [ ] chills  [ ] weight loss  [ ]night sweat  [ ]poor appetite/PO intake [ ]fatigue   Skin:  [ ] rash [ ] phlebitis	  Eyes: [ ] icterus [ ] pain  [ ] discharge	  ENMT: [ ] sore throat  [ ] thrush [ ] ulcers [ ] exudates [ ]anosmia  Respiratory: [ ] dyspnea [ ] hemoptysis [ ] cough [ ] sputum	  Cardiovascular:  [ ] chest pain [ ] palpitations [ ] edema	  Gastrointestinal:  [ ] nausea [ ] vomiting [ ] diarrhea [ ] constipation [X pain	  Genitourinary:  [ ] dysuria [ ] frequency [ ] hematuria [ ] discharge [ ] flank pain  [ ] incontinence  Musculoskeletal:  [ ] myalgias [ ] arthralgias [ ] arthritis  [ ] back pain  Neurological:  [ ] headache [ ] weakness [ ] seizures  [ ] confusion/altered mental status    prior hospital charts reviewed [V]  primary team notes reviewed [V]  other consultant notes reviewed [V]    PAST MEDICAL & SURGICAL HISTORY:  No pertinent past medical history    No significant past surgical history  SOCIAL HISTORY:  - Denied smoking/vaping/alcohol/recreational drug use    FAMILY HISTORY:    Allergies  No Known Allergies    ANTIMICROBIALS:  azithromycin  IVPB 1000 once  piperacillin/tazobactam IVPB.. 3.375 every 8 hours    ANTIMICROBIALS (past 90 days):  MEDICATIONS  (STANDING):    OTHER MEDS:   MEDICATIONS  (STANDING):  acetaminophen   IVPB .. 650 once  heparin   Injectable 5000 every 8 hours  HYDROmorphone  Injectable 0.5 every 10 minutes PRN  ondansetron Injectable 4 once PRN  oxytocin Infusion 333.333 <Continuous>    VITALS:  Vital Signs Last 24 Hrs  T(F): 97 (23 @ 09:40), Max: 98.42 (23 @ 03:14)    Vital Signs Last 24 Hrs  HR: 75 (23 @ 10:05) (75 - 99)  BP: 128/84 (23 @ 10:05) (102/55 - 128/84)  RR: 18 (23 @ 10:05)  SpO2: 100% (23 @ 10:05) (100% - 100%)  Wt(kg): --    EXAM:  Physical Exam:  Constitutional:  well preserved, comfortable  Head/Eyes: no icterus, PERRL, EOMI  ENT:  supple; no thrush  LUNGS:  CTA  CVS:  normal S1, S2, no murmur  Abd:  soft, non-tender; pregnant, laparoscopy incision site with no discharge or surrounding erythema   Ext:  no edema  Vascular:  IV site no erythema tenderness or discharge  MSK:  joints without swelling  Neuro: AAO X 3, non- focal    Labs:                        11.8   13.92 )-----------( 269      ( 2023 02:06 )             36.2         135  |  102  |  4<L>  ----------------------------<  99  3.2<L>   |  21<L>  |  0.53    Ca    9.1      2023 02:06    TPro  7.6  /  Alb  3.8  /  TBili  <0.2  /  DBili  x   /  AST  13  /  ALT  10  /  AlkPhos  99      WBC Trend:  WBC Count: 13.92 (23 @ 02:06)    Auto Neutrophil #: 10.33 K/uL (23 @ 02:06)    Creatine Trend:  Creatinine, Serum: 0.53 ()    Liver Biochemical Testing Trend:  Alanine Aminotransferase (ALT/SGPT): 10 ()  Aspartate Aminotransferase (AST/SGOT): 13 (23 @ 02:06)  Bilirubin Total, Serum: <0.2 ()    Trend LDH  23 @ 02:06  136    Auto Eosinophil %: 0.7 % (23 @ 02:06)    MICROBIOLOGY    Lactate Dehydrogenase, Serum: 136 ()    RADIOLOGY:  imaging below personally reviewed

## 2023-06-19 ENCOUNTER — ASOB RESULT (OUTPATIENT)
Age: 18
End: 2023-06-19

## 2023-06-19 ENCOUNTER — APPOINTMENT (OUTPATIENT)
Dept: ANTEPARTUM | Facility: CLINIC | Age: 18
End: 2023-06-19
Payer: COMMERCIAL

## 2023-06-19 DIAGNOSIS — R52 PAIN, UNSPECIFIED: ICD-10-CM

## 2023-06-19 PROBLEM — Z00.00 ENCOUNTER FOR PREVENTIVE HEALTH EXAMINATION: Status: ACTIVE | Noted: 2023-06-19

## 2023-06-19 LAB
ALBUMIN SERPL ELPH-MCNC: 2.7 G/DL — LOW (ref 3.3–5)
ALP SERPL-CCNC: 69 U/L — SIGNIFICANT CHANGE UP (ref 40–120)
ALT FLD-CCNC: 9 U/L — SIGNIFICANT CHANGE UP (ref 4–33)
AMNISURE ROM (RUPTURE OF MEMBRANES): NEGATIVE — SIGNIFICANT CHANGE UP
ANION GAP SERPL CALC-SCNC: 13 MMOL/L — SIGNIFICANT CHANGE UP (ref 7–14)
APPEARANCE UR: CLEAR — SIGNIFICANT CHANGE UP
APTT BLD: 28.9 SEC — SIGNIFICANT CHANGE UP (ref 27–36.3)
AST SERPL-CCNC: 16 U/L — SIGNIFICANT CHANGE UP (ref 4–32)
BASOPHILS # BLD AUTO: 0.05 K/UL — SIGNIFICANT CHANGE UP (ref 0–0.2)
BASOPHILS NFR BLD AUTO: 0.3 % — SIGNIFICANT CHANGE UP (ref 0–2)
BILIRUB SERPL-MCNC: 0.5 MG/DL — SIGNIFICANT CHANGE UP (ref 0.2–1.2)
BILIRUB UR-MCNC: NEGATIVE — SIGNIFICANT CHANGE UP
BUN SERPL-MCNC: 5 MG/DL — LOW (ref 7–23)
C TRACH RRNA SPEC QL NAA+PROBE: SIGNIFICANT CHANGE UP
CALCIUM SERPL-MCNC: 8.3 MG/DL — LOW (ref 8.4–10.5)
CHLORIDE SERPL-SCNC: 106 MMOL/L — SIGNIFICANT CHANGE UP (ref 98–107)
CO2 SERPL-SCNC: 20 MMOL/L — LOW (ref 22–31)
COLOR SPEC: SIGNIFICANT CHANGE UP
CREAT SERPL-MCNC: 0.72 MG/DL — SIGNIFICANT CHANGE UP (ref 0.5–1.3)
DIFF PNL FLD: NEGATIVE — SIGNIFICANT CHANGE UP
EGFR: 123 ML/MIN/1.73M2 — SIGNIFICANT CHANGE UP
EOSINOPHIL # BLD AUTO: 0.01 K/UL — SIGNIFICANT CHANGE UP (ref 0–0.5)
EOSINOPHIL NFR BLD AUTO: 0.1 % — SIGNIFICANT CHANGE UP (ref 0–6)
GLUCOSE SERPL-MCNC: 100 MG/DL — HIGH (ref 70–99)
GLUCOSE UR QL: NEGATIVE — SIGNIFICANT CHANGE UP
HCT VFR BLD CALC: 30.4 % — LOW (ref 34.5–45)
HGB BLD-MCNC: 10 G/DL — LOW (ref 11.5–15.5)
HIV 1+2 AB+HIV1 P24 AG SERPL QL IA: SIGNIFICANT CHANGE UP
IANC: 16.24 K/UL — HIGH (ref 1.8–7.4)
IMM GRANULOCYTES NFR BLD AUTO: 0.7 % — SIGNIFICANT CHANGE UP (ref 0–0.9)
INR BLD: 1.1 RATIO — SIGNIFICANT CHANGE UP (ref 0.88–1.16)
KETONES UR-MCNC: NEGATIVE — SIGNIFICANT CHANGE UP
LEUKOCYTE ESTERASE UR-ACNC: NEGATIVE — SIGNIFICANT CHANGE UP
LYMPHOCYTES # BLD AUTO: 0.99 K/UL — LOW (ref 1–3.3)
LYMPHOCYTES # BLD AUTO: 5.4 % — LOW (ref 13–44)
MCHC RBC-ENTMCNC: 25.3 PG — LOW (ref 27–34)
MCHC RBC-ENTMCNC: 32.9 GM/DL — SIGNIFICANT CHANGE UP (ref 32–36)
MCV RBC AUTO: 76.8 FL — LOW (ref 80–100)
MONOCYTES # BLD AUTO: 0.91 K/UL — HIGH (ref 0–0.9)
MONOCYTES NFR BLD AUTO: 5 % — SIGNIFICANT CHANGE UP (ref 2–14)
N GONORRHOEA RRNA SPEC QL NAA+PROBE: SIGNIFICANT CHANGE UP
NEUTROPHILS # BLD AUTO: 16.24 K/UL — HIGH (ref 1.8–7.4)
NEUTROPHILS NFR BLD AUTO: 88.5 % — HIGH (ref 43–77)
NITRITE UR-MCNC: NEGATIVE — SIGNIFICANT CHANGE UP
NRBC # BLD: 0 /100 WBCS — SIGNIFICANT CHANGE UP (ref 0–0)
NRBC # FLD: 0 K/UL — SIGNIFICANT CHANGE UP (ref 0–0)
PH UR: 6.5 — SIGNIFICANT CHANGE UP (ref 5–8)
PLATELET # BLD AUTO: 249 K/UL — SIGNIFICANT CHANGE UP (ref 150–400)
POTASSIUM SERPL-MCNC: 3 MMOL/L — LOW (ref 3.5–5.3)
POTASSIUM SERPL-SCNC: 3 MMOL/L — LOW (ref 3.5–5.3)
PROT SERPL-MCNC: 6 G/DL — SIGNIFICANT CHANGE UP (ref 6–8.3)
PROT UR-MCNC: ABNORMAL
PROTHROM AB SERPL-ACNC: 12.8 SEC — SIGNIFICANT CHANGE UP (ref 10.5–13.4)
RBC # BLD: 3.96 M/UL — SIGNIFICANT CHANGE UP (ref 3.8–5.2)
RBC # FLD: 14 % — SIGNIFICANT CHANGE UP (ref 10.3–14.5)
SODIUM SERPL-SCNC: 139 MMOL/L — SIGNIFICANT CHANGE UP (ref 135–145)
SP GR SPEC: 1.02 — SIGNIFICANT CHANGE UP (ref 1.01–1.05)
T PALLIDUM AB TITR SER: NEGATIVE — SIGNIFICANT CHANGE UP
UROBILINOGEN FLD QL: SIGNIFICANT CHANGE UP
WBC # BLD: 18.32 K/UL — HIGH (ref 3.8–10.5)
WBC # FLD AUTO: 18.32 K/UL — HIGH (ref 3.8–10.5)

## 2023-06-19 PROCEDURE — 72195 MRI PELVIS W/O DYE: CPT | Mod: 26

## 2023-06-19 PROCEDURE — 76811 OB US DETAILED SNGL FETUS: CPT | Mod: 26

## 2023-06-19 PROCEDURE — 99232 SBSQ HOSP IP/OBS MODERATE 35: CPT

## 2023-06-19 RX ORDER — ACETAMINOPHEN 500 MG
650 TABLET ORAL EVERY 6 HOURS
Refills: 0 | Status: DISCONTINUED | OUTPATIENT
Start: 2023-06-19 | End: 2023-06-22

## 2023-06-19 RX ORDER — ACETAMINOPHEN 500 MG
650 TABLET ORAL ONCE
Refills: 0 | Status: COMPLETED | OUTPATIENT
Start: 2023-06-19 | End: 2023-06-19

## 2023-06-19 RX ORDER — HEPARIN SODIUM 5000 [USP'U]/ML
5000 INJECTION INTRAVENOUS; SUBCUTANEOUS EVERY 12 HOURS
Refills: 0 | Status: DISCONTINUED | OUTPATIENT
Start: 2023-06-19 | End: 2023-06-22

## 2023-06-19 RX ORDER — POTASSIUM CHLORIDE 20 MEQ
20 PACKET (EA) ORAL ONCE
Refills: 0 | Status: COMPLETED | OUTPATIENT
Start: 2023-06-19 | End: 2023-06-19

## 2023-06-19 RX ORDER — MORPHINE SULFATE 50 MG/1
4 CAPSULE, EXTENDED RELEASE ORAL ONCE
Refills: 0 | Status: DISCONTINUED | OUTPATIENT
Start: 2023-06-19 | End: 2023-06-22

## 2023-06-19 RX ORDER — OXYCODONE HYDROCHLORIDE 5 MG/1
2.5 TABLET ORAL ONCE
Refills: 0 | Status: DISCONTINUED | OUTPATIENT
Start: 2023-06-19 | End: 2023-06-19

## 2023-06-19 RX ORDER — POTASSIUM CHLORIDE 20 MEQ
20 PACKET (EA) ORAL
Refills: 0 | Status: COMPLETED | OUTPATIENT
Start: 2023-06-19 | End: 2023-06-19

## 2023-06-19 RX ADMIN — Medication 260 MILLIGRAM(S): at 18:09

## 2023-06-19 RX ADMIN — Medication 1 APPLICATORFUL: at 22:44

## 2023-06-19 RX ADMIN — PIPERACILLIN AND TAZOBACTAM 25 GRAM(S): 4; .5 INJECTION, POWDER, LYOPHILIZED, FOR SOLUTION INTRAVENOUS at 00:11

## 2023-06-19 RX ADMIN — Medication 20 MILLIEQUIVALENT(S): at 16:10

## 2023-06-19 RX ADMIN — OXYCODONE HYDROCHLORIDE 2.5 MILLIGRAM(S): 5 TABLET ORAL at 00:51

## 2023-06-19 RX ADMIN — Medication 260 MILLIGRAM(S): at 11:30

## 2023-06-19 RX ADMIN — Medication 650 MILLIGRAM(S): at 20:00

## 2023-06-19 RX ADMIN — Medication 20 MILLIEQUIVALENT(S): at 20:31

## 2023-06-19 RX ADMIN — PIPERACILLIN AND TAZOBACTAM 25 GRAM(S): 4; .5 INJECTION, POWDER, LYOPHILIZED, FOR SOLUTION INTRAVENOUS at 12:54

## 2023-06-19 RX ADMIN — SODIUM CHLORIDE 125 MILLILITER(S): 9 INJECTION, SOLUTION INTRAVENOUS at 20:31

## 2023-06-19 RX ADMIN — PIPERACILLIN AND TAZOBACTAM 25 GRAM(S): 4; .5 INJECTION, POWDER, LYOPHILIZED, FOR SOLUTION INTRAVENOUS at 20:31

## 2023-06-19 RX ADMIN — Medication 650 MILLIGRAM(S): at 11:54

## 2023-06-19 RX ADMIN — OXYCODONE HYDROCHLORIDE 2.5 MILLIGRAM(S): 5 TABLET ORAL at 02:25

## 2023-06-19 RX ADMIN — OXYCODONE HYDROCHLORIDE 2.5 MILLIGRAM(S): 5 TABLET ORAL at 23:45

## 2023-06-19 RX ADMIN — Medication 20 MILLIEQUIVALENT(S): at 18:07

## 2023-06-19 RX ADMIN — HEPARIN SODIUM 5000 UNIT(S): 5000 INJECTION INTRAVENOUS; SUBCUTANEOUS at 22:28

## 2023-06-19 NOTE — PROGRESS NOTE ADULT - ASSESSMENT
A/P: 18y POD#1  @ 23w+3d s/p dx lsc and abdominal washout for pus in the abdomen. This AM patient continues to complain of abdominal pain.      #abdominal pain  -s/p dx lsc  -ID: HIV cultures, continue with Zosyn, obtain MRI of the pelvic  -f/u BCx  -f/ AM CBC/CMP/Coags  -f/u intraop cultures    #fetal well being  -FHR qD    #maternal well being   -HSQ  -Tylenol MANUEL Santacruz PGY2 A/P: 18y POD#1  @ 23w+3d s/p dx lsc and abdominal washout for pus in the abdomen. This AM patient continues to complain of abdominal pain.      #abdominal pain  -s/p dx lsc  -ID: HIV cultures, continue with Zosyn, obtain MRI of the pelvic  -f/u BCx  -f/ AM CBC/CMP/Coags  -f/u intraop cultures    #fetal well being  -FHR qD    #maternal well being   -HSQ  -Tylenol PRN    E Neeta PGY2    MFM Fellow Addendum      18y POD#1  @ 23w3d s/p dx lsc and abdominal washout for inflammatory debris/pus. VSS. Patient remains in significant abdominal pain in all four quadrants with rebound no retroperitoneal pain. Abdomen distended mild rigidity. WBC 18. Plan for SVE. Complete anatomy ultrasound. GYN oncology consult. Gen surg consult. ID consult to determine if role of broadening antibiotics currently on zosyn. Will continue on IV abx for now. Considered role of re-operation however will defer at this time as patient recently post op and is less <24hrs on IV abx. Would defer amnio at this time to rule out intraamniotic infection as intrabdomianl infection may seed into amniotic cavity. WIll continue to monitor closely    Patient seen with Dr. Barrett (M attending)    Osmany Nguyen M.D. FACOG PGY-6  Maternal Fetal Medicine Fellow  Cell: [phone] if after 5pm or weekend ask labor and delivery for on call fellow

## 2023-06-19 NOTE — CONSULT NOTE ADULT - CONSULT REASON
L ovarian cyst, ?abscess, s/p Dx lsc, pus in abdomen
PID
intraop consult for intraabdominal pus
preoperative planning

## 2023-06-19 NOTE — CHART NOTE - NSCHARTNOTEFT_GEN_A_CORE
Called by OB Team to meet with Ms. Torres and her partner (FOB), as they had more questions for the NICU team.    NICU team consulted for Ms. Torres on 23. Briefly, she is a 17 y/o  at 23w3d GA who presented initially with lower abdominal pain, now s/p diagnostic laparoscopy significant for pyoperitoneum and posterior cul-de-sac mass, for which she is receiving IV antibiotics. Most recent EFW 540g on 23. NICU consulted to discuss what to expect if she were to deliver at 23 weeks gestation.     Ms. Torres and her partner report they have discussed the possibility of delivery at 23 weeks among themselves and understand it is the lower limit of viability. At this time, they have not decided on pursuing either comfort care or full resuscitation. Upon their request, I reviewed again delivery room expectations for both options, in addition to associated risks of complications from prematurity at this gestational age. (please refer to consult note from  for full details).     Ms. Torres and her partner will continue to consider comfort care or a full resuscitation should she go on to deliver at 23 weeks. This provider offered to return tomorrow to check in with them, to which they were amenable.     Above plan was discussed with parents and OB NP Jerad.     Nisha Guevara, PGY-4  NICU Fellow

## 2023-06-19 NOTE — CONSULT NOTE ADULT - SUBJECTIVE AND OBJECTIVE BOX
GYN Onc Consult Note    18y , [place] 23w3d consulted for preoperative planning. She presented via EMS to L&D triage around 1am on  for constant, severe lower abdominal pain (L>R) that woke her up from sleep. Rating it at 10/10 pain at that time. Denies feeling anything like this before.       Allergies: Denies  Medications: PNV    Denies Medical and Surgical HX  Denies Etoh/Smoke/Drugs/Psy  (2023 02:14)      OB/GYN HISTORY:   Physician:   Ob:   - G1:   - G2:   Gyn: Denies fibroids, cysts, PCOS, endometriosis, or history of genital lesions   PMH: Denies  PAST MEDICAL & SURGICAL HISTORY:  No pertinent past medical history      No significant past surgical history        PSH: Denies   Meds:  MEDICATIONS  (STANDING):  acetaminophen   IVPB .. 650 milliGRAM(s) IV Intermittent once  chlorhexidine 2% Cloths 1 Application(s) Topical daily  clotrimazole 2% Vaginal Cream 1 Applicatorful Vaginal two times a day  heparin   Injectable 5000 Unit(s) SubCutaneous every 12 hours  lactated ringers. 1000 milliLiter(s) (125 mL/Hr) IV Continuous <Continuous>  morphine  - Injectable 4 milliGRAM(s) IV Push once  piperacillin/tazobactam IVPB.. 3.375 Gram(s) IV Intermittent every 8 hours  potassium chloride    Tablet ER 20 milliEquivalent(s) Oral every 2 hours  prenatal multivitamin 1 Tablet(s) Oral daily    MEDICATIONS  (PRN):  acetaminophen     Tablet .. 650 milliGRAM(s) Oral every 6 hours PRN Mild Pain (1 - 3)    Allergies:   Allergies    No Known Allergies    Intolerances          REVIEW OF SYSTEMS  General: denies fevers, chills, tiredness  Skin/Breast: denies breast pain  Respiratory and Thorax: denies shortness of breath or cough  Cardiovascular: denies chest pain, palpitations  Gastrointestinal: denies abdominal pain, nausea/ vomiting	  Genitourinary: denies dysuria, increased urinary frequency, urgency, abnormal vaginal discharge,   Constitutional, Cardiovascular, Respiratory, Gastrointestinal, Genitourinary, Musculoskeletal and Integumentary review of systems are normal except as noted. 	      Vital Signs Last 24 Hrs  T(C): 37.1 (2023 13:55), Max: 37.2 (2023 05:47)  T(F): 98.7 (2023 13:55), Max: 98.9 (2023 05:47)  HR: 92 (2023 13:55) (60 - 103)  BP: 99/55 (2023 13:55) (94/58 - 107/69)  BP(mean): 70 (2023 23:45) (59 - 75)  RR: 16 (2023 13:55) (16 - 19)  SpO2: 98% (2023 13:55) (98% - 100%)    Parameters below as of 2023 13:55  Patient On (Oxygen Delivery Method): room air        PHYSICAL EXAM: Chaperoned w/ RN at bedside.   Gen: NAD, alert and oriented x 3  Cardiovascular: regular   Respiratory: breathing comfortably on RA  Abd: soft, non tender, non-distended  Pelvic: closed/long, no CMT, Uterus: normal size, non tender  Adnexa: non tender, no palpable masses  Extremities: NTBL  Skin: warm and well perfused      LABS:                        10.0   18.32 )-----------( 249      ( 2023 05:54 )             30.4     06-19    139  |  106  |  5<L>  ----------------------------<  100<H>  3.0<L>   |  20<L>  |  0.72    Ca    8.3<L>      2023 05:54    TPro  6.0  /  Alb  2.7<L>  /  TBili  0.5  /  DBili  x   /  AST  16  /  ALT  9   /  AlkPhos  69  -19    PT/INR - ( 2023 05:54 )   PT: 12.8 sec;   INR: 1.10 ratio         PTT - ( 2023 05:54 )  PTT:28.9 sec  Urinalysis Basic - ( 2023 01:18 )    Color: Light Yellow / Appearance: Clear / S.016 / pH: x  Gluc: x / Ketone: Negative  / Bili: Negative / Urobili: <2 mg/dL   Blood: x / Protein: Trace / Nitrite: Negative   Leuk Esterase: Negative / RBC: x / WBC x   Sq Epi: x / Non Sq Epi: x / Bacteria: x        RADIOLOGY & ADDITIONAL STUDIES:   GYN Onc Consult Note    18y, with known L ovarian cyst, , [place] 23w3d for whom GynOnc consulted for preoperative planning. She presented via EMS to L&D triage around 1am on  for constant, severe lower abdominal pain (L>R) that woke her up from sleep. Rating it at 10/10 pain at that time. Denies feeling anything like this before. Initial TVUS shows simple L ovarian cyst measuring 5x4.8x4.7cm, and patient was taken to the OR early  morning due to concerns of possible torsion. Diagnostic laparoscopy at that time showed significant pyoperitoneum - purulence noted over the anterior abdominal wall and anterior uterus. Adhesions were also noted with purulence over the R round ligament. Rectovaginal exam under anesthesia notable for firm mass in the posterior cul de sac. Gen surg was consulted intraoperatively, and appendix/bowel/gallbladder etiology were ruled-out.   Patient currently on IV Zosyn, and she is noting improvement in pain. Denies any fevers, chills, nausea, or vomiting. ID consulted for recommendations regarding abx coverage. IR consulted for possible drainage.     She states she discovered the L ovarian cyst incidentally when she went to her first prenatal visit. Has had multiple scans and the cyst has not changed significantly in size. Denies any abdominal bloating, constipation or changes in bowel habits, or early satiety. Also denies any vaginal or rectal bleeding. Reports 2-4 BMs daily, which is normal for her, and denies hard stools, diarrhea or straining with BMs. Denies history of pelvic or abdominal infections in the past.       Allergies: Denies  Medications: PNV    Denies Medical and Surgical HX  Denies Etoh/Smoke/Drugs/Psy  (2023 02:14)      OB/GYN HISTORY:   Physician: Dr. Barbara Leonard ([place])  OB: P0  Gyn: Known L ovarian cyst (measured approx [id number] when first discovered); Denies fibroids, PCOS, endometriosis, or history of genital lesions; denies history of pelvic or abdominal infections       - Upon review of prenatal records on spouse's phone, patient was negative for gonorrhea/chlamydia/HIV testing  PMH: Denies  PAST MEDICAL & SURGICAL HISTORY:  No pertinent past medical history      No significant past surgical history    Family history: Diabetes in both sets of grandparents; denies family h/o any cancers    Meds:  MEDICATIONS  (STANDING):  acetaminophen   IVPB .. 650 milliGRAM(s) IV Intermittent once  chlorhexidine 2% Cloths 1 Application(s) Topical daily  clotrimazole 2% Vaginal Cream 1 Applicatorful Vaginal two times a day  heparin   Injectable 5000 Unit(s) SubCutaneous every 12 hours  lactated ringers. 1000 milliLiter(s) (125 mL/Hr) IV Continuous <Continuous>  morphine  - Injectable 4 milliGRAM(s) IV Push once  piperacillin/tazobactam IVPB.. 3.375 Gram(s) IV Intermittent every 8 hours  potassium chloride    Tablet ER 20 milliEquivalent(s) Oral every 2 hours  prenatal multivitamin 1 Tablet(s) Oral daily    MEDICATIONS  (PRN):  acetaminophen     Tablet .. 650 milliGRAM(s) Oral every 6 hours PRN Mild Pain (1 - 3)    Allergies:   Allergies    No Known Allergies        REVIEW OF SYSTEMS  General: denies fevers, chills, tiredness  Skin/Breast: denies rash  Respiratory and Thorax: denies shortness of breath or cough  Cardiovascular: denies chest pain, palpitations  Gastrointestinal: reports improvement in abdominal pain, denies nausea/ vomiting	; denies constipation or diarrhea  Genitourinary: denies dysuria, increased urinary frequency, urgency, abnormal vaginal discharge,   Constitutional, Cardiovascular, Respiratory, Gastrointestinal, Genitourinary, Musculoskeletal and Integumentary review of systems are normal except as noted. 	      Vital Signs Last 24 Hrs  T(C): 37.1 (2023 13:55), Max: 37.2 (2023 05:47)  T(F): 98.7 (2023 13:55), Max: 98.9 (2023 05:47)  HR: 92 (2023 13:55) (60 - 103)  BP: 99/55 (2023 13:55) (94/58 - 107/69)  BP(mean): 70 (2023 23:45) (59 - 75)  RR: 16 (2023 13:55) (16 - 19)  SpO2: 98% (2023 13:55) (98% - 100%)    Parameters below as of 2023 13:55  Patient On (Oxygen Delivery Method): room air        PHYSICAL EXAM: Chaperoned w/ RN at bedside.   Gen: NAD, alert and oriented x 3  Cardiovascular: regular   Respiratory: breathing comfortably on RA  Abd: soft, TTP in LLQ, mildly distended, gravid uterus  Extremities: NTBL  Skin: warm and well perfused      LABS:                        10.0   18.32 )-----------( 249      ( 2023 05:54 )             30.4         139  |  106  |  5<L>  ----------------------------<  100<H>  3.0<L>   |  20<L>  |  0.72    Ca    8.3<L>      2023 05:54    TPro  6.0  /  Alb  2.7<L>  /  TBili  0.5  /  DBili  x   /  AST  16  /  ALT  9   /  AlkPhos  69      PT/INR - ( 2023 05:54 )   PT: 12.8 sec;   INR: 1.10 ratio         PTT - ( 2023 05:54 )  PTT:28.9 sec  Urinalysis Basic - ( 2023 01:18 )    Color: Light Yellow / Appearance: Clear / S.016 / pH: x  Gluc: x / Ketone: Negative  / Bili: Negative / Urobili: <2 mg/dL   Blood: x / Protein: Trace / Nitrite: Negative   Leuk Esterase: Negative / RBC: x / WBC x   Sq Epi: x / Non Sq Epi: x / Bacteria: x      RADIOLOGY & ADDITIONAL STUDIES:  < from: US Pelvis Complete (US Pelvis Complete .) (23 @ 03:06) >  TECHNIQUE: Limited transabdominal imaging of the pregnancy was performed.   Transvaginal imaging of the ovaries was performed.    FINDINGS:  Single live intrauterine gestation.    Right ovary: Not visualized  Left ovary: There is a simple appearing 5.3 cm x 4.7 cm x 4.8 cm left   ovarian cyst. The compressed left ovarian parenchyma demonstrates venous   vascular flow.    Fluid: None.    IMPRESSION:    Simple 5.3 cm left ovarian cyst.    < end of copied text >      < from: MR Pelvis No Cont (23 @ 10:30) >  FINDINGS:  LOWER CHEST: Small bilateral pleural effusions.    LIVER: Within normal limits.  BILE DUCTS: Normal caliber.  GALLBLADDER: Within normal limits.  SPLEEN: Within normal limits.  PANCREAS: Within normal limits.  ADRENALS: Within normal limits.  KIDNEYS/URETERS: Mild bilateral hydroureteronephrosis to the level of the   gravid uterus.    UTERUS: Gravid uterus with single intrauterine gestation in breech   presentation. Anterior placenta. No previa. Cervix is closed.    Ovaries: Left ovary measures 1.6 x 1.4 x 1.7 cm. Right ovary not   discretely visualized. A cyst extending from the cul-de-sac to the left   adnexa measures 6.4 x 4.2 x 5.2 cm.    VISUALIZED PORTIONS:  BOWEL: Fluid distention ofsmall bowel and colon.  PERITONEUM: Small amount of free fluid. No identifiable loculated fluid   collection.  VESSELS: Within normal limits.  RETROPERITONEUM/LYMPH NODES: No lymphadenopathy.  ABDOMINAL WALL: Mild subcutaneous edema.  BONES: Within normal limits.    IMPRESSION:  *  Limited examination. Only limited sequences were obtained due to   patient pain.  *  Mild bilateral hydroureteronephrosis to the level of the gravid   uterus, consistent with pregnancy-related change.  *  Small amount of free fluid in the pelvis. No evidence of abscess.  *  Cyst extending from the cul-de-sac to the left adnexa measures up to   6.4 cm. As the right ovary is not discretely visualized, findings raise a   question of right adnexal origin.    --- End of Report ---    < end of copied text >    PATHOLOGY/Cultures:  Intraoperative abdominal washings ():  Moderate polymorphonuclear leukocytes per low power field  Moderate GN rods per oil power field  Few GP cocci in pairs per oil power field   GYN Onc Consult Note    18y with known L ovarian cyst, , [place] 23w3d for whom GynOnc consulted for preoperative consultation. She presented via EMS to L&D triage around 1am on  for constant, severe lower abdominal pain (L>R) that woke her up from sleep. Rating it at 10/10 pain at that time. Denies feeling anything like this before. Initial TVUS shows simple L ovarian cyst measuring 5x4.8x4.7cm, and patient was taken to the OR early  morning due to concerns of possible torsion. Diagnostic laparoscopy at that time showed significant pyoperitoneum - purulence noted over the anterior abdominal wall and anterior uterus. Adhesions were also noted with purulence over the R round ligament. Rectovaginal exam under anesthesia notable for firm mass in the posterior cul de sac. Gen surg was consulted intraoperatively, and appendix/bowel/gallbladder etiology were ruled-out.   Patient currently on IV Zosyn, and she is noting improvement in pain. Denies any fevers, chills, nausea, or vomiting. ID consulted for recommendations regarding abx coverage. IR consulted for possible drainage.     She states she discovered the L ovarian cyst incidentally when she went to her first prenatal visit. Has had multiple scans over the course of her pregnancy and the cyst has not changed significantly in size. Denies any abdominal bloating, constipation or changes in bowel habits, or early satiety. Also denies any vaginal or rectal bleeding. Reports 2-4 BMs daily, which is normal for her, and denies hard stools, diarrhea or straining with BMs. Denies history of pelvic or abdominal infections in the past.  Negative STI screening with initial prenatal labs.         OB/GYN HISTORY:   Physician: Dr. Barbara Leonard ([place])  OB: P0  Gyn: Known L ovarian cyst (measured approx [id number] when first discovered); Denies fibroids, PCOS, endometriosis, or history of genital lesions; denies history of pelvic or abdominal infections       - Upon review of prenatal records on spouse's phone, patient was negative for gonorrhea/chlamydia/HIV testing  Denies Medical and Surgical HX  Denies Etoh/Smoke/Drugs/Psy  Allergies: Denies  Medications: PNV   Family history: Diabetes in both sets of grandparents; denies family h/o any cancers    Meds:  MEDICATIONS  (STANDING):  acetaminophen   IVPB .. 650 milliGRAM(s) IV Intermittent once  chlorhexidine 2% Cloths 1 Application(s) Topical daily  clotrimazole 2% Vaginal Cream 1 Applicatorful Vaginal two times a day  heparin   Injectable 5000 Unit(s) SubCutaneous every 12 hours  lactated ringers. 1000 milliLiter(s) (125 mL/Hr) IV Continuous <Continuous>  morphine  - Injectable 4 milliGRAM(s) IV Push once  piperacillin/tazobactam IVPB.. 3.375 Gram(s) IV Intermittent every 8 hours  potassium chloride    Tablet ER 20 milliEquivalent(s) Oral every 2 hours  prenatal multivitamin 1 Tablet(s) Oral daily      REVIEW OF SYSTEMS  General: denies fevers, chills, tiredness  Skin/Breast: denies rash  Respiratory and Thorax: denies shortness of breath or cough  Cardiovascular: denies chest pain, palpitations  Gastrointestinal: reports improvement in abdominal pain, denies nausea/ vomiting	; denies constipation or diarrhea  Genitourinary: denies dysuria, increased urinary frequency, urgency, abnormal vaginal discharge,   Constitutional, Cardiovascular, Respiratory, Gastrointestinal, Genitourinary, Musculoskeletal and Integumentary review of systems are normal except as noted. 	      Vital Signs Last 24 Hrs  T(C): 37.1 (2023 13:55), Max: 37.2 (2023 05:47)  T(F): 98.7 (2023 13:55), Max: 98.9 (2023 05:47)  HR: 92 (2023 13:55) (60 - 103)  BP: 99/55 (2023 13:55) (94/58 - 107/69)  BP(mean): 70 (2023 23:45) (59 - 75)  RR: 16 (2023 13:55) (16 - 19)  SpO2: 98% (2023 13:55) (98% - 100%)    Parameters below as of 2023 13:55  Patient On (Oxygen Delivery Method): room air        PHYSICAL EXAM:   Gen: NAD, alert and oriented x 3  Cardiovascular: regular   Respiratory: breathing comfortably on RA  Abd: soft, TTP in LLQ, mildly distended, gravid uterus  Pelvic: Deferred.   Extremities: NTBL  Skin: warm and well perfused      LABS:                        10.0   18.32 )-----------( 249      ( 2023 05:54 )             30.4         139  |  106  |  5<L>  ----------------------------<  100<H>  3.0<L>   |  20<L>  |  0.72    Ca    8.3<L>      2023 05:54    TPro  6.0  /  Alb  2.7<L>  /  TBili  0.5  /  DBili  x   /  AST  16  /  ALT  9   /  AlkPhos  69  -19    PT/INR - ( 2023 05:54 )   PT: 12.8 sec;   INR: 1.10 ratio         PTT - ( 2023 05:54 )  PTT:28.9 sec  Urinalysis Basic - ( 2023 01:18 )    Color: Light Yellow / Appearance: Clear / S.016 / pH: x  Gluc: x / Ketone: Negative  / Bili: Negative / Urobili: <2 mg/dL   Blood: x / Protein: Trace / Nitrite: Negative   Leuk Esterase: Negative / RBC: x / WBC x   Sq Epi: x / Non Sq Epi: x / Bacteria: x      RADIOLOGY & ADDITIONAL STUDIES:  < from: US Pelvis Complete (US Pelvis Complete .) (23 @ 03:06) >  TECHNIQUE: Limited transabdominal imaging of the pregnancy was performed.   Transvaginal imaging of the ovaries was performed.    FINDINGS:  Single live intrauterine gestation.    Right ovary: Not visualized  Left ovary: There is a simple appearing 5.3 cm x 4.7 cm x 4.8 cm left   ovarian cyst. The compressed left ovarian parenchyma demonstrates venous   vascular flow.    Fluid: None.    IMPRESSION:    Simple 5.3 cm left ovarian cyst.    < end of copied text >      < from: MR Pelvis No Cont (23 @ 10:30) >  FINDINGS:  LOWER CHEST: Small bilateral pleural effusions.    LIVER: Within normal limits.  BILE DUCTS: Normal caliber.  GALLBLADDER: Within normal limits.  SPLEEN: Within normal limits.  PANCREAS: Within normal limits.  ADRENALS: Within normal limits.  KIDNEYS/URETERS: Mild bilateral hydroureteronephrosis to the level of the   gravid uterus.    UTERUS: Gravid uterus with single intrauterine gestation in breech   presentation. Anterior placenta. No previa. Cervix is closed.    Ovaries: Left ovary measures 1.6 x 1.4 x 1.7 cm. Right ovary not   discretely visualized. A cyst extending from the cul-de-sac to the left   adnexa measures 6.4 x 4.2 x 5.2 cm.    VISUALIZED PORTIONS:  BOWEL: Fluid distention ofsmall bowel and colon.  PERITONEUM: Small amount of free fluid. No identifiable loculated fluid   collection.  VESSELS: Within normal limits.  RETROPERITONEUM/LYMPH NODES: No lymphadenopathy.  ABDOMINAL WALL: Mild subcutaneous edema.  BONES: Within normal limits.    IMPRESSION:  *  Limited examination. Only limited sequences were obtained due to   patient pain.  *  Mild bilateral hydroureteronephrosis to the level of the gravid   uterus, consistent with pregnancy-related change.  *  Small amount of free fluid in the pelvis. No evidence of abscess.  *  Cyst extending from the cul-de-sac to the left adnexa measures up to   6.4 cm. As the right ovary is not discretely visualized, findings raise a   question of right adnexal origin.    --- End of Report ---    < end of copied text >    PATHOLOGY/Cultures:  Intraoperative abdominal washings ():  Moderate polymorphonuclear leukocytes per low power field  Moderate GN rods per oil power field  Few GP cocci in pairs per oil power field

## 2023-06-19 NOTE — PROGRESS NOTE ADULT - ASSESSMENT
18F 23.2 weeks gestation presenting with severe L sided abd pain, US with L ovarian cyst, s/p OR on 6/18 for diagnostic lap found to have diffuse purulence over anterior abd wall and anterior uterine wall. Evaluated by surgery intraop and with normal appendix, small bowel, gallbladder, stomach/ pylorus and visible portion of colon. Leukocytosis. No fevers. MRI with no abscess, cyst from the cul-de-sac to the L adnexa, R ovary not visualized, findings raise a question of R adnexal origin. HIV negative, urine gonorrhea/ chlamydia negative.    Recommend:  #Abd pain concerning for PID/ TOA  -F/U OR cultures  -Continue zosyn  -Monitor fever curve  -Trend WBC, elevation may be reactive to recent surgery  -F/U blood cultures  -IR consulted, no plan for drainage    Frank Barrera MD  Available through MS Teams  If no response, or after 5pm/weekends, call [phone]

## 2023-06-19 NOTE — CHART NOTE - NSCHARTNOTEFT_GEN_A_CORE
Pt c/o itchy hands and feet, occurring more in the evening/nighttime. Denies abdominal pain, epigastric pain, LOF, vb. Endorses +FM    Vital Signs Last 24 Hrs  T(C): 37.1 (19 Jun 2023 13:55), Max: 37.2 (19 Jun 2023 05:47)  T(F): 98.7 (19 Jun 2023 13:55), Max: 98.9 (19 Jun 2023 05:47)  HR: 92 (19 Jun 2023 13:55) (60 - 103)  BP: 99/55 (19 Jun 2023 13:55) (94/58 - 107/69)  BP(mean): 70 (18 Jun 2023 23:45) (59 - 75)  RR: 16 (19 Jun 2023 13:55) (16 - 19)  SpO2: 98% (19 Jun 2023 13:55) (98% - 100%)    Parameters below as of 19 Jun 2023 13:55  Patient On (Oxygen Delivery Method): room air                            10.0   18.32 )-----------( 249      ( 19 Jun 2023 05:54 )             30.4     06-19    139  |  106  |  5<L>  ----------------------------<  100<H>  3.0<L>   |  20<L>  |  0.72    Ca    8.3<L>      19 Jun 2023 05:54    TPro  6.0  /  Alb  2.7<L>  /  TBili  0.5  /  DBili  x   /  AST  16  /  ALT  9   /  AlkPhos  69  06-19        Bile acids ordered  Benadryl PRN  OTC lotion      d/w Dr. Santacruz PGY3  Nayeli Mars FNP-BC

## 2023-06-19 NOTE — CONSULT NOTE ADULT - ASSESSMENT
18 yof  @ 23w3d GA with lower abdominal pain who is s/p diagnostic laparoscopy which showed pyoperitoneum and posterior cul-de-sac mass. Patient is currently receiving IV Zosyn and is with improvement in pain. GynOnc consulted for possible preoperative surgical management.    - patient appears stable at this time  - Gyn Onc service available for any future intraoperative consults  - surgical planning per primary team recommendations      Patient seen and discussed with LINDA Johnson (PGY3) and Dr. Aguilar, GynOnc Fellow    Ruben Macias, PGY1 18 yof  @ 23w3d GA with lower abdominal pain who is s/p diagnostic laparoscopy which showed pyoperitoneum and posterior cul-de-sac mass.  GynOnc consulted for pre-operative surgical consideration 2/2 dense adhesions limiting initial diagnostic laparoscopy in the circumstance that patient requires additional surgical intervention.  Patient is currently receiving IV Zosyn and demonstrating clinical signs of improvement.    - Vital signs stable. Benign abdomen with improvement of pain.   - Agree with use of IV antibiotics and pain control.   - Gyn Onc service available for an intraoperative consult.   - Surgical planning per primary team recommendations.      Patient seen and discussed with LINDA Johnson (PGY3) and Dr. Aguilar, GynOnc Fellow  Ruben Macias, PGY2 18 yof  @ 23w3d GA with lower abdominal pain who is s/p diagnostic laparoscopy which showed pyoperitoneum and posterior cul-de-sac mass.  GynOnc consulted for pre-operative surgical consideration 2/2 dense adhesions limiting initial diagnostic laparoscopy in the circumstance that patient requires additional surgical intervention.  Patient is currently receiving IV Zosyn and demonstrating clinical signs of improvement.    - Gyn Onc service available for an intraoperative consult.   - Surgical planning per primary team      Patient seen and discussed with LINDA Johnson (PGY3) and Dr. Aguilar, GynOnc Fellow  Ruben Macias, PGY2

## 2023-06-19 NOTE — CHART NOTE - NSCHARTNOTEFT_GEN_A_CORE
Pt seen and examined to rule out ROM, as possible source of infection, as recommended by Dr. Barrett on MFM rounds today. Pt denies LOF, VB. Pt c/o abdominal pain 6 out of 10, s/p IV Tylenol. Endorses +FM.    Vital Signs Last 24 Hrs  T(C): 37.1 (19 Jun 2023 13:55), Max: 37.2 (19 Jun 2023 05:47)  T(F): 98.7 (19 Jun 2023 13:55), Max: 98.9 (19 Jun 2023 05:47)  HR: 92 (19 Jun 2023 13:55) (60 - 103)  BP: 99/55 (19 Jun 2023 13:55) (94/58 - 107/69)  BP(mean): 70 (18 Jun 2023 23:45) (59 - 75)  RR: 16 (19 Jun 2023 13:55) (16 - 19)  SpO2: 98% (19 Jun 2023 13:55) (98% - 100%)    Parameters below as of 19 Jun 2023 13:55  Patient On (Oxygen Delivery Method): room air                            10.0   18.32 )-----------( 249      ( 19 Jun 2023 05:54 )             30.4     06-19    139  |  106  |  5<L>  ----------------------------<  100<H>  3.0<L>   |  20<L>  |  0.72    Ca    8.3<L>      19 Jun 2023 05:54    TPro  6.0  /  Alb  2.7<L>  /  TBili  0.5  /  DBili  x   /  AST  16  /  ALT  9   /  AlkPhos  69  06-19      abdomen: diffuse tenderness, abdomen rigid  Amnisure collected  SSE: cervix appears closed. Moderate amount yeast in canal. No pooling, negative nitrazine, negative ferning  SVE: closed/long/-3        Amnisure pending  for GYN/ONC consult  for ID consult  for Gen/surge consult  MR abdomen results pending  Continue to monitor    d/w Dr. Santacruz PGY3 & Dr. Nguyen MFM fellow  Nayeli Mars [facility] Pt seen and examined to rule out ROM, as possible source of infection, as recommended by Dr. Barrett on MFM rounds today. Pt denies LOF, VB. Pt c/o abdominal pain 6 out of 10, s/p IV Tylenol. Endorses +FM.    Vital Signs Last 24 Hrs  T(C): 37.1 (19 Jun 2023 13:55), Max: 37.2 (19 Jun 2023 05:47)  T(F): 98.7 (19 Jun 2023 13:55), Max: 98.9 (19 Jun 2023 05:47)  HR: 92 (19 Jun 2023 13:55) (60 - 103)  BP: 99/55 (19 Jun 2023 13:55) (94/58 - 107/69)  BP(mean): 70 (18 Jun 2023 23:45) (59 - 75)  RR: 16 (19 Jun 2023 13:55) (16 - 19)  SpO2: 98% (19 Jun 2023 13:55) (98% - 100%)    Parameters below as of 19 Jun 2023 13:55  Patient On (Oxygen Delivery Method): room air                            10.0   18.32 )-----------( 249      ( 19 Jun 2023 05:54 )             30.4     06-19    139  |  106  |  5<L>  ----------------------------<  100<H>  3.0<L>   |  20<L>  |  0.72    Ca    8.3<L>      19 Jun 2023 05:54    TPro  6.0  /  Alb  2.7<L>  /  TBili  0.5  /  DBili  x   /  AST  16  /  ALT  9   /  AlkPhos  69  06-19      abdomen: diffuse tenderness, abdomen rigid  Amnisure collected  SSE: cervix appears closed. Moderate amount yeast in canal. No pooling, negative nitrazine, negative ferning  SVE: closed/long/-3        Amnisure pending, however low suspicion for ROM  for GYN/ONC consult  for ID consult  for Gen/surge consult  MR abdomen results pending  Continue to monitor    d/w Dr. Santacruz PGY3 & Dr. Nguyen MFM fellow  Nayeli Mars [facility]

## 2023-06-19 NOTE — CONSULT NOTE ADULT - PROBLEM SELECTOR RECOMMENDATION 9
GYN ONC Fellow Addendum:    Pt seen and examined at bedside. Agree with above. 19yo at 23wks s/p dx LSC w/ purulent intraabdominal contents. On IV zosyn, VSS. Last received pain medication last evening.    VS reviewed  Labs reviewed    Gyn oncology available for intraoperative consultation if needed     Rema Aguilar MD

## 2023-06-20 LAB
ALBUMIN SERPL ELPH-MCNC: 2.7 G/DL — LOW (ref 3.3–5)
ALBUMIN SERPL ELPH-MCNC: 2.8 G/DL — LOW (ref 3.3–5)
ALP SERPL-CCNC: 68 U/L — SIGNIFICANT CHANGE UP (ref 40–120)
ALP SERPL-CCNC: 72 U/L — SIGNIFICANT CHANGE UP (ref 40–120)
ALT FLD-CCNC: 7 U/L — SIGNIFICANT CHANGE UP (ref 4–33)
ALT FLD-CCNC: 9 U/L — SIGNIFICANT CHANGE UP (ref 4–33)
ANION GAP SERPL CALC-SCNC: 14 MMOL/L — SIGNIFICANT CHANGE UP (ref 7–14)
ANION GAP SERPL CALC-SCNC: 14 MMOL/L — SIGNIFICANT CHANGE UP (ref 7–14)
APTT BLD: 29.5 SEC — SIGNIFICANT CHANGE UP (ref 27–36.3)
AST SERPL-CCNC: 12 U/L — SIGNIFICANT CHANGE UP (ref 4–32)
AST SERPL-CCNC: 14 U/L — SIGNIFICANT CHANGE UP (ref 4–32)
BILIRUB SERPL-MCNC: 0.3 MG/DL — SIGNIFICANT CHANGE UP (ref 0.2–1.2)
BILIRUB SERPL-MCNC: 0.4 MG/DL — SIGNIFICANT CHANGE UP (ref 0.2–1.2)
BUN SERPL-MCNC: 4 MG/DL — LOW (ref 7–23)
BUN SERPL-MCNC: 4 MG/DL — LOW (ref 7–23)
CALCIUM SERPL-MCNC: 8.4 MG/DL — SIGNIFICANT CHANGE UP (ref 8.4–10.5)
CALCIUM SERPL-MCNC: 8.8 MG/DL — SIGNIFICANT CHANGE UP (ref 8.4–10.5)
CHLORIDE SERPL-SCNC: 104 MMOL/L — SIGNIFICANT CHANGE UP (ref 98–107)
CHLORIDE SERPL-SCNC: 106 MMOL/L — SIGNIFICANT CHANGE UP (ref 98–107)
CO2 SERPL-SCNC: 20 MMOL/L — LOW (ref 22–31)
CO2 SERPL-SCNC: 20 MMOL/L — LOW (ref 22–31)
CREAT SERPL-MCNC: 0.53 MG/DL — SIGNIFICANT CHANGE UP (ref 0.5–1.3)
CREAT SERPL-MCNC: 0.57 MG/DL — SIGNIFICANT CHANGE UP (ref 0.5–1.3)
CULTURE RESULTS: SIGNIFICANT CHANGE UP
EGFR: 133 ML/MIN/1.73M2 — SIGNIFICANT CHANGE UP
EGFR: 136 ML/MIN/1.73M2 — SIGNIFICANT CHANGE UP
FIBRINOGEN PPP-MCNC: 726 MG/DL — HIGH (ref 200–465)
GLUCOSE SERPL-MCNC: 82 MG/DL — SIGNIFICANT CHANGE UP (ref 70–99)
GLUCOSE SERPL-MCNC: 85 MG/DL — SIGNIFICANT CHANGE UP (ref 70–99)
GROUP B BETA STREP DNA (PCR): SIGNIFICANT CHANGE UP
HCT VFR BLD CALC: 29.1 % — LOW (ref 34.5–45)
HGB BLD-MCNC: 9.5 G/DL — LOW (ref 11.5–15.5)
INR BLD: 1.14 RATIO — SIGNIFICANT CHANGE UP (ref 0.88–1.16)
MCHC RBC-ENTMCNC: 25.1 PG — LOW (ref 27–34)
MCHC RBC-ENTMCNC: 32.6 GM/DL — SIGNIFICANT CHANGE UP (ref 32–36)
MCV RBC AUTO: 77 FL — LOW (ref 80–100)
NRBC # BLD: 0 /100 WBCS — SIGNIFICANT CHANGE UP (ref 0–0)
NRBC # FLD: 0 K/UL — SIGNIFICANT CHANGE UP (ref 0–0)
PLATELET # BLD AUTO: 260 K/UL — SIGNIFICANT CHANGE UP (ref 150–400)
POTASSIUM SERPL-MCNC: 2.8 MMOL/L — CRITICAL LOW (ref 3.5–5.3)
POTASSIUM SERPL-MCNC: 3.4 MMOL/L — LOW (ref 3.5–5.3)
POTASSIUM SERPL-SCNC: 2.8 MMOL/L — CRITICAL LOW (ref 3.5–5.3)
POTASSIUM SERPL-SCNC: 3.4 MMOL/L — LOW (ref 3.5–5.3)
PROT SERPL-MCNC: 5.6 G/DL — LOW (ref 6–8.3)
PROT SERPL-MCNC: 6.1 G/DL — SIGNIFICANT CHANGE UP (ref 6–8.3)
PROTHROM AB SERPL-ACNC: 13.2 SEC — SIGNIFICANT CHANGE UP (ref 10.5–13.4)
RBC # BLD: 3.78 M/UL — LOW (ref 3.8–5.2)
RBC # FLD: 14.1 % — SIGNIFICANT CHANGE UP (ref 10.3–14.5)
SODIUM SERPL-SCNC: 138 MMOL/L — SIGNIFICANT CHANGE UP (ref 135–145)
SODIUM SERPL-SCNC: 140 MMOL/L — SIGNIFICANT CHANGE UP (ref 135–145)
SOURCE GROUP B STREP: SIGNIFICANT CHANGE UP
SPECIMEN SOURCE: SIGNIFICANT CHANGE UP
WBC # BLD: 16.18 K/UL — HIGH (ref 3.8–10.5)
WBC # FLD AUTO: 16.18 K/UL — HIGH (ref 3.8–10.5)

## 2023-06-20 PROCEDURE — 99232 SBSQ HOSP IP/OBS MODERATE 35: CPT

## 2023-06-20 PROCEDURE — 93010 ELECTROCARDIOGRAM REPORT: CPT

## 2023-06-20 RX ORDER — POTASSIUM CHLORIDE 20 MEQ
10 PACKET (EA) ORAL
Refills: 0 | Status: COMPLETED | OUTPATIENT
Start: 2023-06-20 | End: 2023-06-20

## 2023-06-20 RX ORDER — SODIUM CHLORIDE 9 MG/ML
3 INJECTION INTRAMUSCULAR; INTRAVENOUS; SUBCUTANEOUS EVERY 8 HOURS
Refills: 0 | Status: DISCONTINUED | OUTPATIENT
Start: 2023-06-20 | End: 2023-06-20

## 2023-06-20 RX ORDER — POTASSIUM CHLORIDE 20 MEQ
20 PACKET (EA) ORAL
Refills: 0 | Status: DISCONTINUED | OUTPATIENT
Start: 2023-06-20 | End: 2023-06-20

## 2023-06-20 RX ADMIN — CHLORHEXIDINE GLUCONATE 1 APPLICATION(S): 213 SOLUTION TOPICAL at 11:50

## 2023-06-20 RX ADMIN — Medication 100 MILLIEQUIVALENT(S): at 11:19

## 2023-06-20 RX ADMIN — HEPARIN SODIUM 5000 UNIT(S): 5000 INJECTION INTRAVENOUS; SUBCUTANEOUS at 22:23

## 2023-06-20 RX ADMIN — Medication 100 MILLIEQUIVALENT(S): at 10:17

## 2023-06-20 RX ADMIN — Medication 650 MILLIGRAM(S): at 20:31

## 2023-06-20 RX ADMIN — OXYCODONE HYDROCHLORIDE 2.5 MILLIGRAM(S): 5 TABLET ORAL at 00:15

## 2023-06-20 RX ADMIN — Medication 100 MILLIEQUIVALENT(S): at 09:19

## 2023-06-20 RX ADMIN — PIPERACILLIN AND TAZOBACTAM 25 GRAM(S): 4; .5 INJECTION, POWDER, LYOPHILIZED, FOR SOLUTION INTRAVENOUS at 05:05

## 2023-06-20 RX ADMIN — Medication 1 APPLICATORFUL: at 22:23

## 2023-06-20 RX ADMIN — PIPERACILLIN AND TAZOBACTAM 25 GRAM(S): 4; .5 INJECTION, POWDER, LYOPHILIZED, FOR SOLUTION INTRAVENOUS at 15:00

## 2023-06-20 RX ADMIN — Medication 650 MILLIGRAM(S): at 15:02

## 2023-06-20 RX ADMIN — Medication 650 MILLIGRAM(S): at 15:34

## 2023-06-20 RX ADMIN — Medication 650 MILLIGRAM(S): at 01:05

## 2023-06-20 RX ADMIN — Medication 650 MILLIGRAM(S): at 21:04

## 2023-06-20 RX ADMIN — HEPARIN SODIUM 5000 UNIT(S): 5000 INJECTION INTRAVENOUS; SUBCUTANEOUS at 11:47

## 2023-06-20 RX ADMIN — PIPERACILLIN AND TAZOBACTAM 25 GRAM(S): 4; .5 INJECTION, POWDER, LYOPHILIZED, FOR SOLUTION INTRAVENOUS at 22:21

## 2023-06-20 RX ADMIN — Medication 1 TABLET(S): at 11:46

## 2023-06-20 RX ADMIN — Medication 650 MILLIGRAM(S): at 01:35

## 2023-06-20 NOTE — PROGRESS NOTE ADULT - ASSESSMENT
18 year old  @ 23w+4d admitted to antepartum with abdominal pain. Patient is currently s/p Dx Lsc and abdominal washout where kenzie pus was noted in the abdomen. Patient currently receiving antibiotics kenzie pus in the abdomen.     18 year old  @ 23w+4d admitted to antepartum with abdominal pain. Patient is currently s/p Dx Lsc and abdominal washout where kenzie pus was noted in the abdomen. Patient currently receiving antibiotics kenzie pus in the abdomen c/f PID vs chronic TOA.  Patient is currently on IV antibiotics    #abdominal pain  -Appreciate ID recs: c/w Zosyn (-), no need to broaden antibitoics  -Appreciate gen surg: Avaiable for OR should patient need laparotomy and abdominal washout  -Appreciate GYN Onc: Available for backup should patient go to the OR  -f/u BCx, Intra-abdominal cx x3  -HIV (): neg  -SSE: neg amnisure    #yeast infection  -c/w clotrimazole    #maternal well being  -c/w HSQ  -Tylenol for pain control  -Morphine PRN, Oxy PRN for pain control    #fetal well being  -fetal heart rate qD    E Neeta PGY3     18 year old  @ 23w+4d admitted to antepartum with abdominal pain. Patient is currently s/p Dx Lsc and abdominal washout where kenzie pus was noted in the abdomen. Patient currently receiving antibiotics kenzie pus in the abdomen c/f PID vs chronic TOA.  Patient is currently on IV antibiotics    #abdominal pain  -Appreciate ID recs: c/w Zosyn (-), no need to broaden antibitoics  -Appreciate gen surg: Avaiable for OR should patient need laparotomy and abdominal washout  -Appreciate GYN Onc: Available for backup should patient go to the OR  -f/u BCx, Intra-abdominal cx x3  -HIV (): neg  -SSE: neg amnisure    #yeast infection  -c/w clotrimazole    #maternal well being  -c/w HSQ  -Tylenol for pain control  -Morphine PRN, Oxy PRN for pain control    #fetal well being  -fetal heart rate qD  -Appreciate NICU consult ()    AMANDA Santacruz PGY3

## 2023-06-20 NOTE — PROGRESS NOTE ADULT - ASSESSMENT
18F 23.2 weeks gestation presenting with severe L sided abd pain, US with L ovarian cyst, s/p OR on 6/18 for diagnostic lap found to have diffuse purulence over anterior abd wall and anterior uterine wall. Evaluated by surgery intraop and with normal appendix, small bowel, gallbladder, stomach/ pylorus and visible portion of colon. Leukocytosis. No fevers. MRI with no abscess, cyst from the cul-de-sac to the L adnexa, R ovary not visualized, findings raise a question of R adnexal origin. HIV negative, urine gonorrhea/ chlamydia negative.    Recommend:  #Abd pain concerning for PID/ TOA  -F/U OR cultures  -Continue zosyn  -Monitor fever curve  -Trend WBC  -F/U blood cultures so far no growth    Frank Barrera MD  Available through MS Teams  If no response, or after 5pm/weekends, call [phone]

## 2023-06-21 LAB
ALBUMIN SERPL ELPH-MCNC: 2.6 G/DL — LOW (ref 3.3–5)
ALP SERPL-CCNC: 85 U/L — SIGNIFICANT CHANGE UP (ref 40–120)
ALT FLD-CCNC: 9 U/L — SIGNIFICANT CHANGE UP (ref 4–33)
ANION GAP SERPL CALC-SCNC: 16 MMOL/L — HIGH (ref 7–14)
AST SERPL-CCNC: 15 U/L — SIGNIFICANT CHANGE UP (ref 4–32)
BASOPHILS # BLD AUTO: 0.05 K/UL — SIGNIFICANT CHANGE UP (ref 0–0.2)
BASOPHILS NFR BLD AUTO: 0.4 % — SIGNIFICANT CHANGE UP (ref 0–2)
BILIRUB SERPL-MCNC: 0.4 MG/DL — SIGNIFICANT CHANGE UP (ref 0.2–1.2)
BLD GP AB SCN SERPL QL: POSITIVE — SIGNIFICANT CHANGE UP
BUN SERPL-MCNC: 5 MG/DL — LOW (ref 7–23)
CALCIUM SERPL-MCNC: 8.8 MG/DL — SIGNIFICANT CHANGE UP (ref 8.4–10.5)
CHLORIDE SERPL-SCNC: 103 MMOL/L — SIGNIFICANT CHANGE UP (ref 98–107)
CO2 SERPL-SCNC: 20 MMOL/L — LOW (ref 22–31)
CREAT SERPL-MCNC: 0.56 MG/DL — SIGNIFICANT CHANGE UP (ref 0.5–1.3)
EGFR: 134 ML/MIN/1.73M2 — SIGNIFICANT CHANGE UP
EOSINOPHIL # BLD AUTO: 0.09 K/UL — SIGNIFICANT CHANGE UP (ref 0–0.5)
EOSINOPHIL NFR BLD AUTO: 0.7 % — SIGNIFICANT CHANGE UP (ref 0–6)
GLUCOSE SERPL-MCNC: 79 MG/DL — SIGNIFICANT CHANGE UP (ref 70–99)
HCT VFR BLD CALC: 29.4 % — LOW (ref 34.5–45)
HGB BLD-MCNC: 9.6 G/DL — LOW (ref 11.5–15.5)
IANC: 10.32 K/UL — HIGH (ref 1.8–7.4)
IMM GRANULOCYTES NFR BLD AUTO: 0.6 % — SIGNIFICANT CHANGE UP (ref 0–0.9)
LYMPHOCYTES # BLD AUTO: 0.92 K/UL — LOW (ref 1–3.3)
LYMPHOCYTES # BLD AUTO: 7.6 % — LOW (ref 13–44)
MCHC RBC-ENTMCNC: 25.3 PG — LOW (ref 27–34)
MCHC RBC-ENTMCNC: 32.7 GM/DL — SIGNIFICANT CHANGE UP (ref 32–36)
MCV RBC AUTO: 77.6 FL — LOW (ref 80–100)
MONOCYTES # BLD AUTO: 0.64 K/UL — SIGNIFICANT CHANGE UP (ref 0–0.9)
MONOCYTES NFR BLD AUTO: 5.3 % — SIGNIFICANT CHANGE UP (ref 2–14)
NEUTROPHILS # BLD AUTO: 10.32 K/UL — HIGH (ref 1.8–7.4)
NEUTROPHILS NFR BLD AUTO: 85.4 % — HIGH (ref 43–77)
NRBC # BLD: 0 /100 WBCS — SIGNIFICANT CHANGE UP (ref 0–0)
NRBC # FLD: 0 K/UL — SIGNIFICANT CHANGE UP (ref 0–0)
PLATELET # BLD AUTO: 265 K/UL — SIGNIFICANT CHANGE UP (ref 150–400)
POTASSIUM SERPL-MCNC: 3.5 MMOL/L — SIGNIFICANT CHANGE UP (ref 3.5–5.3)
POTASSIUM SERPL-SCNC: 3.5 MMOL/L — SIGNIFICANT CHANGE UP (ref 3.5–5.3)
PROT SERPL-MCNC: 5.9 G/DL — LOW (ref 6–8.3)
RBC # BLD: 3.79 M/UL — LOW (ref 3.8–5.2)
RBC # FLD: 14 % — SIGNIFICANT CHANGE UP (ref 10.3–14.5)
RH IG SCN BLD-IMP: NEGATIVE — SIGNIFICANT CHANGE UP
SODIUM SERPL-SCNC: 139 MMOL/L — SIGNIFICANT CHANGE UP (ref 135–145)
WBC # BLD: 12.09 K/UL — HIGH (ref 3.8–10.5)
WBC # FLD AUTO: 12.09 K/UL — HIGH (ref 3.8–10.5)

## 2023-06-21 PROCEDURE — 86077 PHYS BLOOD BANK SERV XMATCH: CPT

## 2023-06-21 RX ADMIN — Medication 650 MILLIGRAM(S): at 13:30

## 2023-06-21 RX ADMIN — HEPARIN SODIUM 5000 UNIT(S): 5000 INJECTION INTRAVENOUS; SUBCUTANEOUS at 11:29

## 2023-06-21 RX ADMIN — PIPERACILLIN AND TAZOBACTAM 25 GRAM(S): 4; .5 INJECTION, POWDER, LYOPHILIZED, FOR SOLUTION INTRAVENOUS at 22:19

## 2023-06-21 RX ADMIN — Medication 650 MILLIGRAM(S): at 18:39

## 2023-06-21 RX ADMIN — HEPARIN SODIUM 5000 UNIT(S): 5000 INJECTION INTRAVENOUS; SUBCUTANEOUS at 22:16

## 2023-06-21 RX ADMIN — PIPERACILLIN AND TAZOBACTAM 25 GRAM(S): 4; .5 INJECTION, POWDER, LYOPHILIZED, FOR SOLUTION INTRAVENOUS at 06:02

## 2023-06-21 RX ADMIN — PIPERACILLIN AND TAZOBACTAM 25 GRAM(S): 4; .5 INJECTION, POWDER, LYOPHILIZED, FOR SOLUTION INTRAVENOUS at 13:47

## 2023-06-21 RX ADMIN — Medication 650 MILLIGRAM(S): at 04:10

## 2023-06-21 RX ADMIN — Medication 650 MILLIGRAM(S): at 18:54

## 2023-06-21 RX ADMIN — Medication 650 MILLIGRAM(S): at 05:30

## 2023-06-21 RX ADMIN — Medication 1 TABLET(S): at 11:29

## 2023-06-21 RX ADMIN — Medication 650 MILLIGRAM(S): at 13:03

## 2023-06-21 RX ADMIN — Medication 1 APPLICATORFUL: at 22:14

## 2023-06-21 NOTE — PROGRESS NOTE ADULT - ASSESSMENT
18 year old  @ 23w+5d admitted to antepartum with abdominal pain. Patient is currently s/p Dx Lsc and abdominal washout where kenzie pus was noted in the abdomen. Patient currently receiving antibiotics kenzie pus in the abdomen c/f PID vs chronic TOA.  Patient is currently on IV antibiotics    #abdominal pain  -Appreciate ID recs: c/w Zosyn (-), no need to broaden antibitoics  -Appreciate gen surg: Avaiable for OR should patient need laparotomy and abdominal washout  -Appreciate GYN Onc: Available for backup should patient go to the OR  -f/u BCx, Intra-abdominal cx x3  -HIV (): neg  -SSE: neg amnisure    #yeast infection  -c/w clotrimazole    #maternal well being  -c/w HSQ  -Tylenol for pain control  -Morphine PRN, Oxy PRN for pain control    #fetal well being  -fetal heart rate qD  -Appreciate NICU consult ()    AMANDA Santacruz PGY3

## 2023-06-21 NOTE — PROGRESS NOTE ADULT - ATTENDING COMMENTS
Patient seen and examined  She has no complains and tolerated diet well  Afebrile  Abdomen is soft and non tender  FHR +  Peritonitis is resolving with antibiotics although the etiology remains unclear  Follow up cultures and continue antibiotics
Patient seen and examined  Patient complains of significantly less pain than before  On exam abdomen is soft and not rigid and no rebound tenderness today.  This is a significant improvement over her previous exam yesterday and peritonitis is improving  Will continue IV antibiotics and monitor vital signs urine output and signs of worsening  At this time I don't believe she needs to go to operating room again

## 2023-06-22 ENCOUNTER — APPOINTMENT (OUTPATIENT)
Dept: ANTEPARTUM | Facility: CLINIC | Age: 18
End: 2023-06-22
Payer: COMMERCIAL

## 2023-06-22 ENCOUNTER — ASOB RESULT (OUTPATIENT)
Age: 18
End: 2023-06-22

## 2023-06-22 ENCOUNTER — TRANSCRIPTION ENCOUNTER (OUTPATIENT)
Age: 18
End: 2023-06-22

## 2023-06-22 VITALS
DIASTOLIC BLOOD PRESSURE: 54 MMHG | HEART RATE: 61 BPM | SYSTOLIC BLOOD PRESSURE: 107 MMHG | RESPIRATION RATE: 17 BRPM | OXYGEN SATURATION: 98 % | TEMPERATURE: 98 F

## 2023-06-22 PROBLEM — Z78.9 OTHER SPECIFIED HEALTH STATUS: Chronic | Status: ACTIVE | Noted: 2023-06-18

## 2023-06-22 LAB
BASOPHILS # BLD AUTO: 0.04 K/UL — SIGNIFICANT CHANGE UP (ref 0–0.2)
BASOPHILS NFR BLD AUTO: 0.4 % — SIGNIFICANT CHANGE UP (ref 0–2)
EOSINOPHIL # BLD AUTO: 0.12 K/UL — SIGNIFICANT CHANGE UP (ref 0–0.5)
EOSINOPHIL NFR BLD AUTO: 1.2 % — SIGNIFICANT CHANGE UP (ref 0–6)
FIBRINOGEN AG PPP IA-MCNC: 533 MG/DL — HIGH (ref 206–478)
HCT VFR BLD CALC: 30.8 % — LOW (ref 34.5–45)
HGB BLD-MCNC: 10.1 G/DL — LOW (ref 11.5–15.5)
IANC: 8.15 K/UL — HIGH (ref 1.8–7.4)
IMM GRANULOCYTES NFR BLD AUTO: 1 % — HIGH (ref 0–0.9)
LYMPHOCYTES # BLD AUTO: 1.18 K/UL — SIGNIFICANT CHANGE UP (ref 1–3.3)
LYMPHOCYTES # BLD AUTO: 11.5 % — LOW (ref 13–44)
MCHC RBC-ENTMCNC: 25 PG — LOW (ref 27–34)
MCHC RBC-ENTMCNC: 32.8 GM/DL — SIGNIFICANT CHANGE UP (ref 32–36)
MCV RBC AUTO: 76.2 FL — LOW (ref 80–100)
MONOCYTES # BLD AUTO: 0.67 K/UL — SIGNIFICANT CHANGE UP (ref 0–0.9)
MONOCYTES NFR BLD AUTO: 6.5 % — SIGNIFICANT CHANGE UP (ref 2–14)
NEUTROPHILS # BLD AUTO: 8.15 K/UL — HIGH (ref 1.8–7.4)
NEUTROPHILS NFR BLD AUTO: 79.4 % — HIGH (ref 43–77)
NRBC # BLD: 0 /100 WBCS — SIGNIFICANT CHANGE UP (ref 0–0)
NRBC # FLD: 0 K/UL — SIGNIFICANT CHANGE UP (ref 0–0)
PLATELET # BLD AUTO: 289 K/UL — SIGNIFICANT CHANGE UP (ref 150–400)
RBC # BLD: 4.04 M/UL — SIGNIFICANT CHANGE UP (ref 3.8–5.2)
RBC # FLD: 13.5 % — SIGNIFICANT CHANGE UP (ref 10.3–14.5)
WBC # BLD: 10.26 K/UL — SIGNIFICANT CHANGE UP (ref 3.8–10.5)
WBC # FLD AUTO: 10.26 K/UL — SIGNIFICANT CHANGE UP (ref 3.8–10.5)

## 2023-06-22 PROCEDURE — 99232 SBSQ HOSP IP/OBS MODERATE 35: CPT

## 2023-06-22 PROCEDURE — 76816 OB US FOLLOW-UP PER FETUS: CPT | Mod: 26

## 2023-06-22 RX ORDER — ASCORBIC ACID 60 MG
1 TABLET,CHEWABLE ORAL
Qty: 0 | Refills: 0 | DISCHARGE
Start: 2023-06-22

## 2023-06-22 RX ORDER — OXYCODONE HYDROCHLORIDE 5 MG/1
5 TABLET ORAL ONCE
Refills: 0 | Status: DISCONTINUED | OUTPATIENT
Start: 2023-06-22 | End: 2023-06-22

## 2023-06-22 RX ORDER — ACETAMINOPHEN 500 MG
650 TABLET ORAL ONCE
Refills: 0 | Status: COMPLETED | OUTPATIENT
Start: 2023-06-22 | End: 2023-06-22

## 2023-06-22 RX ORDER — ASCORBIC ACID 60 MG
500 TABLET,CHEWABLE ORAL DAILY
Refills: 0 | Status: DISCONTINUED | OUTPATIENT
Start: 2023-06-22 | End: 2023-06-22

## 2023-06-22 RX ORDER — FERROUS SULFATE 325(65) MG
1 TABLET ORAL
Qty: 0 | Refills: 0 | DISCHARGE
Start: 2023-06-22

## 2023-06-22 RX ORDER — ACETAMINOPHEN 500 MG
2 TABLET ORAL
Qty: 0 | Refills: 0 | DISCHARGE
Start: 2023-06-22

## 2023-06-22 RX ORDER — FERROUS SULFATE 325(65) MG
325 TABLET ORAL DAILY
Refills: 0 | Status: DISCONTINUED | OUTPATIENT
Start: 2023-06-22 | End: 2023-06-22

## 2023-06-22 RX ADMIN — Medication 260 MILLIGRAM(S): at 09:49

## 2023-06-22 RX ADMIN — OXYCODONE HYDROCHLORIDE 5 MILLIGRAM(S): 5 TABLET ORAL at 14:33

## 2023-06-22 RX ADMIN — Medication 650 MILLIGRAM(S): at 09:49

## 2023-06-22 RX ADMIN — PIPERACILLIN AND TAZOBACTAM 25 GRAM(S): 4; .5 INJECTION, POWDER, LYOPHILIZED, FOR SOLUTION INTRAVENOUS at 06:04

## 2023-06-22 RX ADMIN — Medication 650 MILLIGRAM(S): at 00:57

## 2023-06-22 RX ADMIN — Medication 650 MILLIGRAM(S): at 01:51

## 2023-06-22 NOTE — DISCHARGE NOTE ANTEPARTUM - HOSPITAL COURSE
18 year old  @ 23w+5d admitted to antepartum with abdominal pain. Patient is currently s/p Dx Lsc and abdominal washout where kenzie pus was noted in the abdomen. Patient currently receiving antibiotics kenzie pus in the abdomen c/f PID vs chronic TOA.  Appreciate ID recs: c/w Zosyn (-), no need to broaden antibitoics; can discharge on Augmentin. Appreciate gen surg: Avaiable for OR should patient need laparotomy and abdominal washout  -Appreciate GYN Onc: Available for backup should patient go to the OR. BCx, NGTD Intra-abdominal cx with Enerococcus Avium. HIV (): neg. SSE: neg amnisure (). yeast infection treated with clotrimazole (-) 18 year old  @ 23w+5d admitted to antepartum with abdominal pain. Patient is currently s/p Dx Lsc and abdominal washout where kenzie pus was noted in the abdomen. Patient currently receiving antibiotics kenzie pus in the abdomen c/f PID vs chronic TOA.  Appreciate ID recs: c/w Zosyn (-), no need to broaden antibitoics; can discharge on Augmentin. Appreciate gen surg: Avaiable for OR should patient need laparotomy and abdominal washout  Appreciate GYN Onc: Available for backup should patient go to the OR. BCx, NGTD Intra-abdominal cx with Enerococcus Avium. HIV (): neg. SSE: neg amnisure (). yeast infection treated with clotrimazole (-). Pt doing well pain much improved. She is stable to go home on Augmentin with close OB follow up within one week.

## 2023-06-22 NOTE — DISCHARGE NOTE ANTEPARTUM - MEDICATION SUMMARY - MEDICATIONS TO TAKE
I will START or STAY ON the medications listed below when I get home from the hospital:    acetaminophen 325 mg oral tablet  -- 2 tab(s) by mouth every 6 hours As needed Mild Pain (1 - 3)  -- Indication: For Tylenol    Prenatal Multivitamins oral tablet  -- 1 tablet by mouth once a day  -- Indication: For PNV    ferrous sulfate 325 mg (65 mg elemental iron) oral tablet  -- 1 tab(s) by mouth once a day  -- Indication: For Anemia    amoxicillin-clavulanate 875 mg-125 mg oral tablet  -- 1 tab(s) by mouth 2 times a day  -- Indication: For Infection    ascorbic acid 500 mg oral tablet  -- 1 tab(s) by mouth once a day  -- Indication: For Anemia

## 2023-06-22 NOTE — PROGRESS NOTE ADULT - ASSESSMENT
18 year old  @ 23w+5d admitted to antepartum with abdominal pain. Patient is currently s/p Dx Lsc and abdominal washout where kenzie pus was noted in the abdomen. Patient currently receiving antibiotics kenzie pus in the abdomen c/f PID vs chronic TOA.  Patient is currently on IV antibiotics    #abdominal pain  -Appreciate ID recs: c/w Zosyn (-), no need to broaden antibitoics  -Appreciate gen surg: Avaiable for OR should patient need laparotomy and abdominal washout  -Appreciate GYN Onc: Available for backup should patient go to the OR  -f/u BCx, Intra-abdominal cx x3  -HIV (): neg  -SSE: neg amnisure ()    #yeast infection  -c/w clotrimazole (-)    #maternal well being  -c/w HSQ  -Tylenol for pain control  -Morphine PRN, Oxy PRN for pain control    #fetal well being  -fetal heart rate qD  -Appreciate NICU consult ()    AMANDA Santacruz PGY3   18 year old  @ 23w+6d admitted to antepartum with abdominal pain. Patient is currently s/p Dx Lsc and abdominal washout where kenzie pus was noted in the abdomen. Patient currently receiving antibiotics kenzie pus in the abdomen c/f PID vs chronic TOA.  Patient is currently on IV antibiotics    #abdominal pain  -Appreciate ID recs: c/w Zosyn (-), no need to broaden antibitoics  -Appreciate gen surg: Avaiable for OR should patient need laparotomy and abdominal washout  -Appreciate GYN Onc: Available for backup should patient go to the OR  -f/u BCx, Intra-abdominal cx x3  -HIV (): neg  -SSE: neg amnisure ()    #yeast infection  -c/w clotrimazole (-)    #maternal well being  -c/w HSQ  -Tylenol for pain control  -Morphine PRN, Oxy PRN for pain control    #fetal well being  -fetal heart rate qD  -Appreciate NICU consult ()    AMANDA Santacruz PGY3    MFM Fellow Addendum    18y POD#4 @ 23w6d s/p dx lsc and abdominal washout for inflammatory debris/pus. VSS. Asxs. Pt feeling significantly better now on IV abx with abdomen overall benign. Cultures speciating E. Avium with sensitivities to augmentin. Plan to discharge on PO abx as per ID. Has short interval follow up with provider. Precautions reviewed.    Patient seen with Dr. Coburn (M attending)    Osmany Nguyen M.D. FACOG PGY-6  Maternal Fetal Medicine Fellow  Cell: [phone] if after 5pm or weekend ask labor and delivery for on call fellow

## 2023-06-22 NOTE — DISCHARGE NOTE ANTEPARTUM - PATIENT PORTAL LINK FT
You can access the FollowMyHealth Patient Portal offered by [facility] by registering at the following website: [email or web address]. By joining [facility]’s FollowMyHealth portal, you will also be able to view your health information using other applications (apps) compatible with our system.

## 2023-06-22 NOTE — DISCHARGE NOTE ANTEPARTUM - PLAN OF CARE
- Follow up with Dr Michelle within one week of discharge  - Continue antibiotics as prescribed until completed  - Please return with increased pain, vomiting, contractions, vaginal bleeding, leaking fluid or decreased fetal movement

## 2023-06-22 NOTE — CHART NOTE - NSCHARTNOTEFT_GEN_A_CORE
NICU provider checked in with OB team and parents today. No current plans for delivery at 23 weeks. Re-consult NICU team as needed.     Nisha Guevara, PGY-4  NICU Fellow

## 2023-06-22 NOTE — DISCHARGE NOTE ANTEPARTUM - CARE PROVIDER_API CALL
Barbara Leonard  Obstetrics and Gynecology  [address] [place] [address]  Phone: [phone]  Fax: [phone]  Follow Up Time:

## 2023-06-22 NOTE — PROGRESS NOTE ADULT - SUBJECTIVE AND OBJECTIVE BOX
CC: Patient is a 18y old  Female who presents with a chief complaint of lower abd pain (2023 14:38)    ID following for pelvic abscess    Interval History/ROS: Patient remains with lower pelvic pain. No longer febrile. WBC elevated today. MRI with small amount of free fluid in the pelvis. No evidence of abscess. Cyst extending from the cul-de-sac to the left adnexa measures up to   6.4 cm. As the right ovary is not discretely visualized, findings raise a question of right adnexal origin.    Rest of ROS negative.    Allergies  No Known Allergies    ANTIMICROBIALS:  piperacillin/tazobactam IVPB.. 3.375 every 8 hours    OTHER MEDS:  acetaminophen     Tablet .. 650 milliGRAM(s) Oral every 6 hours PRN  chlorhexidine 2% Cloths 1 Application(s) Topical daily  clotrimazole 2% Vaginal Cream 1 Applicatorful Vaginal at bedtime  heparin   Injectable 5000 Unit(s) SubCutaneous every 12 hours  lactated ringers. 1000 milliLiter(s) IV Continuous <Continuous>  morphine  - Injectable 4 milliGRAM(s) IV Push once  potassium chloride    Tablet ER 20 milliEquivalent(s) Oral every 2 hours  prenatal multivitamin 1 Tablet(s) Oral daily    PE:    Vital Signs Last 24 Hrs  T(C): 37.1 (2023 13:55), Max: 37.2 (2023 05:47)  T(F): 98.7 (2023 13:55), Max: 98.9 (2023 05:47)  HR: 92 (2023 13:55) (88 - 103)  BP: 99/55 (2023 13:55) (94/58 - 107/69)  BP(mean): 70 (2023 23:45) (59 - 75)  RR: 16 (2023 13:55) (16 - 18)  SpO2: 98% (2023 13:55) (98% - 100%)    Parameters below as of 2023 13:55  Patient On (Oxygen Delivery Method): room air    Gen: AOx3, NAD  CV: S1+S2 normal, no murmurs  Resp: Clear bilat, no resp distress  Abd: Soft, lower pelvic tender, +BS  Ext: No LE edema, no wounds  : No Goldman  IV/Skin: No thrombophlebitis, anterior surgical sites intact  Neuro: no focal deficits    LABS:                          10.0   18.32 )-----------( 249      ( 2023 05:54 )             30.4           139  |  106  |  5<L>  ----------------------------<  100<H>  3.0<L>   |  20<L>  |  0.72    Ca    8.3<L>      2023 05:54    TPro  6.0  /  Alb  2.7<L>  /  TBili  0.5  /  DBili  x   /  AST  16  /  ALT  9   /  AlkPhos  69        Urinalysis Basic - ( 2023 01:18 )    Color: Light Yellow / Appearance: Clear / S.016 / pH: x  Gluc: x / Ketone: Negative  / Bili: Negative / Urobili: <2 mg/dL   Blood: x / Protein: Trace / Nitrite: Negative   Leuk Esterase: Negative / RBC: x / WBC x   Sq Epi: x / Non Sq Epi: x / Bacteria: x        MICROBIOLOGY:  v  .Genital  23   Routine vaginal kamran isolated  --  --      .Other ANTERIOR WALL UTERINE  23   Testing in progress  --    Moderate polymorphonuclear leukocytes per low power field  Moderate Gram Negative Rods per oil power field  Few Gram positive cocci in pairs per oil power field      .Body Fluid PELVIC FLUID  23   No growth to date  --    polymorphonuclear leukocytes seen  No organisms seen  by cytocentrifuge      .Other VAGINAL  23   Testing in progress  --    No polymorphonuclear leukocytes per low power field  Moderate Gram Negative Rods per oil power field    RADIOLOGY:    < from: MR Pelvis No Cont (23 @ 10:30) >  IMPRESSION:  *  Limited examination. Only limited sequences were obtained due to   patient pain.  *  Mild bilateral hydroureteronephrosis to the level of the gravid   uterus, consistent with pregnancy-related change.  *  Small amount of free fluid in the pelvis. No evidence of abscess.  *  Cyst extending from the cul-de-sac to the left adnexa measures up to   6.4 cm. As the right ovary is not discretely visualized, findings raise a   question of right adnexal origin.    < end of copied text >  
R3 Antepartum Note, HD#3    Patient seen and examined at bedside, no acute overnight events. Patient continues to complain of abdominal pain that does get relief with tylenol. Denies LOF, VB, ctx, HA, epigastric pain, blurred vision, CP, SOB, N/V, fevers, and chills.    Vital Signs Last 24 Hours  T(C): 37 (06-20-23 @ 05:46), Max: 37.1 (06-19-23 @ 13:55)  HR: 96 (06-20-23 @ 05:47) (92 - 227)  BP: 112/64 (06-20-23 @ 05:47) (98/58 - 113/65)  RR: 20 (06-20-23 @ 05:46) (16 - 20)  SpO2: 81% (06-20-23 @ 05:46) (81% - 99%)    CAPILLARY BLOOD GLUCOSE          Physical Exam:  General: NAD  Abdomen: Soft, non-tender, gravid  Ext: No pain or swelling    NST reactive overnight    Labs:             9.5    16.18 )-----------( 260      ( 06-20 @ 05:40 )             29.1     06-19 @ 05:54    139  |  106  |  5   ----------------------------<  100  3.0   |  20  |  0.72    Ca    8.3      06-19 @ 05:54    TPro  6.0  /  Alb  2.7  /  TBili  0.5  /  DBili  x   /  AST  16  /  ALT  9   /  AlkPhos  69  06-19 @ 05:54    PT/INR - ( 06-20 @ 05:40 )   PT: 13.2 sec;   INR: 1.14 ratio    PTT - ( 06-20 @ 05:40 )  PTT:29.5 sec    Uric Acid: (06-18 @ 02:06)  3.0      Fibrinogen: (06-18 @ 02:06)  --       LDH: (06-18 @ 02:06)  136        MEDICATIONS  (STANDING):  chlorhexidine 2% Cloths 1 Application(s) Topical daily  clotrimazole 2% Vaginal Cream 1 Applicatorful Vaginal at bedtime  heparin   Injectable 5000 Unit(s) SubCutaneous every 12 hours  lactated ringers. 1000 milliLiter(s) (125 mL/Hr) IV Continuous <Continuous>  morphine  - Injectable 4 milliGRAM(s) IV Push once  piperacillin/tazobactam IVPB.. 3.375 Gram(s) IV Intermittent every 8 hours  prenatal multivitamin 1 Tablet(s) Oral daily    MEDICATIONS  (PRN):  acetaminophen     Tablet .. 650 milliGRAM(s) Oral every 6 hours PRN Mild Pain (1 - 3)  
SURGERY DAILY PROGRESS NOTE:       SUBJECTIVE/ROS: Patient seen at bedside  Tolerating clears  On zosyn  Pain improving    MEDICATIONS  (STANDING):  chlorhexidine 2% Cloths 1 Application(s) Topical daily  clotrimazole 2% Vaginal Cream 1 Applicatorful Vaginal at bedtime  heparin   Injectable 5000 Unit(s) SubCutaneous every 12 hours  lactated ringers. 1000 milliLiter(s) (125 mL/Hr) IV Continuous <Continuous>  morphine  - Injectable 4 milliGRAM(s) IV Push once  piperacillin/tazobactam IVPB.. 3.375 Gram(s) IV Intermittent every 8 hours  prenatal multivitamin 1 Tablet(s) Oral daily    MEDICATIONS  (PRN):  acetaminophen     Tablet .. 650 milliGRAM(s) Oral every 6 hours PRN Mild Pain (1 - 3)      OBJECTIVE:    Vital Signs Last 24 Hrs  T(C): 37 (2023 09:44), Max: 37.1 (2023 13:55)  T(F): 98.6 (2023 09:44), Max: 98.7 (2023 13:55)  HR: 93 (2023 09:44) (92 - 227)  BP: 101/52 (2023 09:44) (99/55 - 113/65)  BP(mean): --  RR: 18 (2023 09:44) (16 - 20)  SpO2: 96% (2023 09:44) (81% - 99%)    Parameters below as of 2023 09:44  Patient On (Oxygen Delivery Method): room air        I&O's Detail    2023 07:01  -  2023 13:23  --------------------------------------------------------  IN:  Total IN: 0 mL    OUT:    Voided (mL): 300 mL  Total OUT: 300 mL    Total NET: -300 mL          Daily     Daily     LABS:                        9.5    16.18 )-----------( 260      ( 2023 05:40 )             29.1     06-20    138  |  104  |  4<L>  ----------------------------<  85  2.8<LL>   |  20<L>  |  0.57    Ca    8.4      2023 05:40    TPro  6.1  /  Alb  2.7<L>  /  TBili  0.3  /  DBili  x   /  AST  14  /  ALT  7   /  AlkPhos  72  06-20    PT/INR - ( 2023 05:40 )   PT: 13.2 sec;   INR: 1.14 ratio         PTT - ( 2023 05:40 )  PTT:29.5 sec  Urinalysis Basic - ( 2023 01:18 )    Color: Light Yellow / Appearance: Clear / S.016 / pH: x  Gluc: x / Ketone: Negative  / Bili: Negative / Urobili: <2 mg/dL   Blood: x / Protein: Trace / Nitrite: Negative   Leuk Esterase: Negative / RBC: x / WBC x   Sq Epi: x / Non Sq Epi: x / Bacteria: x    PHYSICAL EXAM:  General: AAOx3, NAD, lying comfortably in bed  Respiratory: nonlabored breathing  Abdomen: mildly distended, incisions c/d/i        ASSESSMENT AND PLAN:   17y/o  @23.2wks presents via ambulance with severe lower abdominal pain more on the left side that woke her up at midnight from sleep. Pain scale 10/10. Reports good fetal movement. Patient is admitted and evaluated by OB team. Due to persistent severe abdominal pain , patient was taken to OR emergently  for dsc LSC, poss detorsion, cystectomy.    Surgery is consulted intraoperatively for intraabdominal pus. No noted bowel, appendix and gallbladder injuries noted    - no acute findings on dx lap  - tolerating clears  - no further surgical intervention at this time, please call back with questions  - if plan for RTOR, and surgery needed, please call back pager [phone]    Surgery, [id number]  
CC: Patient is a 18y old  Female who presents with a chief complaint of lower abd pain (22 Jun 2023 13:56)    ID following for PID/ TOA    Interval History/ROS: Patient remains with abd soreness. otherwise overall feeling better. No fevers.    Rest of ROS negative.    Allergies  No Known Allergies    ANTIMICROBIALS:  piperacillin/tazobactam IVPB.. 3.375 every 8 hours    OTHER MEDS:  acetaminophen     Tablet .. 650 milliGRAM(s) Oral every 6 hours PRN  ascorbic acid 500 milliGRAM(s) Oral daily  chlorhexidine 2% Cloths 1 Application(s) Topical daily  ferrous    sulfate 325 milliGRAM(s) Oral daily  heparin   Injectable 5000 Unit(s) SubCutaneous every 12 hours  morphine  - Injectable 4 milliGRAM(s) IV Push once  prenatal multivitamin 1 Tablet(s) Oral daily    PE:    Vital Signs Last 24 Hrs  T(C): 36.8 (22 Jun 2023 13:56), Max: 37.3 (22 Jun 2023 09:29)  T(F): 98.2 (22 Jun 2023 13:56), Max: 99.2 (22 Jun 2023 09:29)  HR: 57 (22 Jun 2023 13:56) (57 - 80)  BP: 107/54 (22 Jun 2023 13:56) (83/51 - 107/54)  BP(mean): --  RR: 17 (22 Jun 2023 13:56) (17 - 18)  SpO2: 98% (22 Jun 2023 13:56) (97% - 99%)    Parameters below as of 22 Jun 2023 13:56  Patient On (Oxygen Delivery Method): room air    Gen: AOx3, NAD  CV: S1+S2 normal, no murmurs  Resp: Clear bilat, no resp distress  Abd: Soft, nontender, +BS  Ext: No LE edema, no wounds  : No Goldman  IV/Skin: No thrombophlebitis, anterior abd surgical sites intact  Neuro: no focal deficits    LABS:                          10.1   10.26 )-----------( 289      ( 22 Jun 2023 05:49 )             30.8       06-21    139  |  103  |  5<L>  ----------------------------<  79  3.5   |  20<L>  |  0.56    Ca    8.8      21 Jun 2023 05:30    TPro  5.9<L>  /  Alb  2.6<L>  /  TBili  0.4  /  DBili  x   /  AST  15  /  ALT  9   /  AlkPhos  85  06-21      Urinalysis Basic - ( 21 Jun 2023 05:30 )    Color: x / Appearance: x / SG: x / pH: x  Gluc: 79 mg/dL / Ketone: x  / Bili: x / Urobili: x   Blood: x / Protein: x / Nitrite: x   Leuk Esterase: x / RBC: x / WBC x   Sq Epi: x / Non Sq Epi: x / Bacteria: x        MICROBIOLOGY:  v  Clean Catch Clean Catch (Midstream)  06-19-23   <10,000 CFU/mL Normal Urogenital Kamran  --  --      .Blood Blood-Peripheral  06-18-23   No growth to date.  --  --      .Blood Blood-Peripheral  06-18-23   No growth to date.  --  --      .Genital  06-18-23   Routine vaginal kamran isolated  --  --      .Other ANTERIOR WALL UTERINE  06-18-23   Culture is being performed. Fungal cultures are held for 4 weeks.  --  Enterococcus avium      .Body Fluid PELVIC FLUID  06-18-23   No growth to date  --    polymorphonuclear leukocytes seen  No organisms seen  by cytocentrifuge      .Other VAGINAL  06-18-23   Culture is being performed.  --    No polymorphonuclear leukocytes per low power field  Moderate Gram Negative Rods per oil power field    RADIOLOGY:    < from: MR Pelvis No Cont (06.19.23 @ 10:30) >  IMPRESSION:  *  Limited examination. Only limited sequences were obtained due to   patient pain.  *  Mild bilateral hydroureteronephrosis to the level of the gravid   uterus, consistent with pregnancy-related change.  *  Small amount of free fluid in the pelvis. No evidence of abscess.  *  Cyst extending from the cul-de-sac to the left adnexa measures up to   6.4 cm. As the right ovary is not discretely visualized, findings raise a   question of right adnexal origin.    < end of copied text >  
MFM Consult Note     19yo  @ 23w2d who is now s/p diagnostic laparoscopy for presumed ovarian torsion found to have intraabdominal pus and cyst in the posterior culdusac adherent and non-accessible given pregnancy. No evidence of ovarian torsion intraop. Patient with continued abdominal pain however she reports feeling better now with the anesthesia medication still on board. Patient explains that prior to pregnancy she has never seen a gynecologist. She denies ever having an infection like gonorrhea or chlamydia. Patient reports that the cyst in her pelvis was noted at the time of her first ultrasound for this pregnancy. She had it monitored for size and was told it would not be an issue but she was counseled on signs and precautions for torsion. She denies any fevers or chills of recent. She denies any other symptoms and reports waking up with 10/10 abdominal pain.     Patient denies any other medical or surgical history.     ICU Vital Signs Last 24 Hrs  T(C): 36.7 (2023 12:30), Max: 36.9 (2023 03:14)  T(F): 98.1 (2023 12:30), Max: 98.42 (2023 03:14)  HR: 59 (2023 12:30) (59 - 99)  BP: 111/66 (2023 12:30) (102/55 - 128/84)  BP(mean): 75 (2023 12:30) (66 - 93)  RR: 18 (2023 12:30) (11 - 21)  SpO2: 99% (2023 12:30) (99% - 100%)    O2 Parameters below as of 2023 09:45  Patient On (Oxygen Delivery Method): room air    Gen: Patient laying still in bed   Pulm: non labored breathing   CV: RRR   Abd: tender to palpation, +rebound, no guarding     TAUS/TVUS performed   EFW 540gm   Breech presentation   Anterior placenta   MVP 5.4     CL 2.3, no funneling or dynamic changes noted   Cyst seen in cul de sac, appears to have layering within cyst and rim enhancements concerning for possible abscess        
SURGERY DAILY PROGRESS NOTE:       SUBJECTIVE/ROS: Patient reporting pain  Tolerating clears  No acute findings on dx lap       MEDICATIONS  (STANDING):  chlorhexidine 2% Cloths 1 Application(s) Topical daily  heparin   Injectable 5000 Unit(s) SubCutaneous every 12 hours  lactated ringers. 1000 milliLiter(s) (125 mL/Hr) IV Continuous <Continuous>  piperacillin/tazobactam IVPB.. 3.375 Gram(s) IV Intermittent every 8 hours  potassium chloride    Tablet ER 20 milliEquivalent(s) Oral every 2 hours  prenatal multivitamin 1 Tablet(s) Oral daily    MEDICATIONS  (PRN):  acetaminophen     Tablet .. 650 milliGRAM(s) Oral every 6 hours PRN Mild Pain (1 - 3)  acetaminophen   IVPB .. 1000 milliGRAM(s) IV Intermittent every 6 hours PRN Mild Pain (1 - 3)  acetaminophen   IVPB .. 1000 milliGRAM(s) IV Intermittent once PRN Mild Pain (1 - 3)  acetaminophen   IVPB .. 1000 milliGRAM(s) IV Intermittent once PRN Mild Pain (1 - 3)      OBJECTIVE:    Vital Signs Last 24 Hrs  T(C): 37 (2023 09:11), Max: 37.2 (2023 05:47)  T(F): 98.6 (2023 09:11), Max: 98.9 (2023 05:47)  HR: 103 (2023 09:11) (59 - 103)  BP: 98/58 (2023 09:11) (94/58 - 128/84)  BP(mean): 70 (2023 23:45) (59 - 93)  RR: 17 (2023 09:11) (11 - 21)  SpO2: 99% (2023 09:11) (99% - 100%)    Parameters below as of 2023 09:11  Patient On (Oxygen Delivery Method): room air        I&O's Detail    2023 07:01  -  2023 07:00  --------------------------------------------------------  IN:    IV PiggyBack: 100 mL    Lactated Ringers: 2500 mL  Total IN: 2600 mL    OUT:    Ureteral Catheter (mL): 875 mL    Voided (mL): 1300 mL  Total OUT: 2175 mL    Total NET: 425 mL          Daily     Daily     LABS:                        10.0   18.32 )-----------( 249      ( 2023 05:54 )             30.4     -    139  |  106  |  x   ----------------------------<  x   3.0<L>   |  x   |  x     Ca    9.1      2023 02:06    TPro  7.6  /  Alb  3.8  /  TBili  <0.2  /  DBili  x   /  AST  13  /  ALT  10  /  AlkPhos  99  06-18    PT/INR - ( 2023 05:54 )   PT: 12.8 sec;   INR: 1.10 ratio         PTT - ( 2023 05:54 )  PTT:28.9 sec  Urinalysis Basic - ( 2023 01:18 )    Color: Light Yellow / Appearance: Clear / S.016 / pH: x  Gluc: x / Ketone: Negative  / Bili: Negative / Urobili: <2 mg/dL   Blood: x / Protein: Trace / Nitrite: Negative   Leuk Esterase: Negative / RBC: x / WBC x   Sq Epi: x / Non Sq Epi: x / Bacteria: x          PHYSICAL EXAM:  General: AAOx3, NAD, lying comfortably in bed  Respiratory: nonlabored breathing  Abdomen: appropriately distended and tender, incision c/d/i,     ASSESSMENT AND PLAN:     - no acute findings on dx lap  - tolerating clears  - no further surgical intervention at this time, please call back with questions    Surgery, [id number]
CC: Patient is a 18y old  Female who presents with a chief complaint of lower abd pain (2023 14:38)    ID following for lower abd pain, intra abd abscess    Interval History/ROS: Patient feeling better today. Abd pain improving. No fevers, WBC improving.    Rest of ROS negative.    Allergies  No Known Allergies    ANTIMICROBIALS:  piperacillin/tazobactam IVPB.. 3.375 every 8 hours    OTHER MEDS:  acetaminophen     Tablet .. 650 milliGRAM(s) Oral every 6 hours PRN  chlorhexidine 2% Cloths 1 Application(s) Topical daily  clotrimazole 2% Vaginal Cream 1 Applicatorful Vaginal at bedtime  heparin   Injectable 5000 Unit(s) SubCutaneous every 12 hours  lactated ringers. 1000 milliLiter(s) IV Continuous <Continuous>  morphine  - Injectable 4 milliGRAM(s) IV Push once  prenatal multivitamin 1 Tablet(s) Oral daily    PE:    Vital Signs Last 24 Hrs  T(C): 36.8 (2023 13:36), Max: 37.1 (2023 22:26)  T(F): 98.3 (2023 13:36), Max: 98.7 (2023 22:26)  HR: 93 (2023 13:36) (93 - 227)  BP: 106/60 (2023 13:36) (101/52 - 113/65)  BP(mean): --  RR: 20 (2023 13:36) (17 - 20)  SpO2: 95% (2023 13:36) (81% - 99%)    Parameters below as of 2023 13:36  Patient On (Oxygen Delivery Method): room air    Gen: AOx3, NAD  CV: S1+S2 normal, no murmurs  Resp: Clear bilat, no resp distress  Abd: Soft, lower pelvic tender, +BS  Ext: No LE edema, no wounds  : No Goldman  IV/Skin: No thrombophlebitis, anterior surgical sites intact  Neuro: no focal deficits    LABS:                          9.5    16.18 )-----------( 260      ( 2023 05:40 )             29.1       06-20    138  |  104  |  4<L>  ----------------------------<  85  2.8<LL>   |  20<L>  |  0.57    Ca    8.4      2023 05:40    TPro  6.1  /  Alb  2.7<L>  /  TBili  0.3  /  DBili  x   /  AST  14  /  ALT  7   /  AlkPhos  72        Urinalysis Basic - ( 2023 01:18 )    Color: Light Yellow / Appearance: Clear / S.016 / pH: x  Gluc: x / Ketone: Negative  / Bili: Negative / Urobili: <2 mg/dL   Blood: x / Protein: Trace / Nitrite: Negative   Leuk Esterase: Negative / RBC: x / WBC x   Sq Epi: x / Non Sq Epi: x / Bacteria: x        MICROBIOLOGY:  v  Clean Catch Clean Catch (Midstream)  23   <10,000 CFU/mL Normal Urogenital Kamran  --  --      .Blood Blood-Peripheral  23   No growth to date.  --  --      .Blood Blood-Peripheral  23   No growth to date.  --  --      .Genital  23   Routine vaginal kamran isolated  --  --      .Other ANTERIOR WALL UTERINE  23   Testing in progress  --    Moderate polymorphonuclear leukocytes per low power field  Moderate Gram Negative Rods per oil power field  Few Gram positive cocci in pairs per oil power field      .Body Fluid PELVIC FLUID  23   No growth to date  --    polymorphonuclear leukocytes seen  No organisms seen  by cytocentrifuge      .Other VAGINAL  23   Testing in progress  --    No polymorphonuclear leukocytes per low power field  Moderate Gram Negative Rods per oil power field    RADIOLOGY:    < from: MR Pelvis No Cont (23 @ 10:30) >  IMPRESSION:  *  Limited examination. Only limited sequences were obtained due to   patient pain.  *  Mild bilateral hydroureteronephrosis to the level of the gravid   uterus, consistent with pregnancy-related change.  *  Small amount of free fluid in the pelvis. No evidence of abscess.  *  Cyst extending from the cul-de-sac to the left adnexa measures up to   6.4 cm. As the right ovary is not discretely visualized, findings raise a   question of right adnexal origin.    < end of copied text >  
R2 OB Progress Note    POD#1   HD#2    Patient seen and examined at bedside.  Overnight patient complained of severe abdominal pain requiring Morphine, Oxycodone. Patient continues to complain of abdominal pain this morning.   Patient is ambulating and tolerating PO.   Patient is voiding spontaneously   Denies CP, SOB, N/V, fevers, and chills.  Denies LOF or vaginal bleeding.    Vital Signs Last 24 Hours  T(C): 37.2 (06-19-23 @ 05:47), Max: 37.2 (06-19-23 @ 05:47)  HR: 96 (06-19-23 @ 05:47) (59 - 96)  BP: 99/58 (06-19-23 @ 05:47) (94/58 - 128/84)  RR: 18 (06-19-23 @ 05:47) (11 - 21)  SpO2: 100% (06-19-23 @ 05:47) (99% - 100%)    I&O's Summary    18 Jun 2023 07:01  -  19 Jun 2023 07:00  --------------------------------------------------------  IN: 2600 mL / OUT: 2175 mL / NET: 425 mL        Physical Exam:  General: NAD  CV: RR, S1, S2, no M/R/G  Lungs: CTA b/l, good air flow b/l   Abdomen: Diffusely tender,  : no bleeding on pad  Ext: No pain or swelling     Labs:                        10.0   18.32 )-----------( 249      ( 19 Jun 2023 05:54 )             30.4   baso 0.3    eos 0.1    imm gran 0.7    lymph 5.4    mono 5.0    poly 88.5                         11.8   13.92 )-----------( 269      ( 18 Jun 2023 02:06 )             36.2   baso 0.5    eos 0.7    imm gran 1.4    lymph 16.0   mono 7.2    poly 74.2       MEDICATIONS  (STANDING):  chlorhexidine 2% Cloths 1 Application(s) Topical daily  heparin   Injectable 5000 Unit(s) SubCutaneous every 12 hours  lactated ringers. 1000 milliLiter(s) (125 mL/Hr) IV Continuous <Continuous>  piperacillin/tazobactam IVPB.. 3.375 Gram(s) IV Intermittent every 8 hours  prenatal multivitamin 1 Tablet(s) Oral daily    MEDICATIONS  (PRN):  acetaminophen     Tablet .. 650 milliGRAM(s) Oral every 6 hours PRN Mild Pain (1 - 3)  acetaminophen   IVPB .. 1000 milliGRAM(s) IV Intermittent every 6 hours PRN Mild Pain (1 - 3)  acetaminophen   IVPB .. 1000 milliGRAM(s) IV Intermittent once PRN Mild Pain (1 - 3)  acetaminophen   IVPB .. 1000 milliGRAM(s) IV Intermittent once PRN Mild Pain (1 - 3)      
R3 Antepartum Note, HD#4    Patient seen and examined at bedside, no acute overnight events. No acute complaints. Pt reports +FM, denies LOF, VB, ctx, HA, epigastric pain, blurred vision, CP, SOB, N/V, fevers, and chills.    Vital Signs Last 24 Hours  T(C): 36.8 (06-21-23 @ 05:34), Max: 37.1 (06-20-23 @ 17:14)  HR: 86 (06-21-23 @ 05:34) (83 - 163)  BP: 103/65 (06-21-23 @ 05:34) (95/55 - 106/60)  RR: 17 (06-21-23 @ 05:34) (17 - 20)  SpO2: 99% (06-21-23 @ 05:34) (80% - 99%)    CAPILLARY BLOOD GLUCOSE          Physical Exam:  General: NAD  Abdomen: Soft, non-tender, gravid  Ext: No pain or swelling      Labs:             9.5    16.18 )-----------( 260      ( 06-20 @ 05:40 )             29.1     06-20 @ 14:26    140  |  106  |  4   ----------------------------<  82  3.4   |  20  |  0.53    Ca    8.8      06-20 @ 14:26    TPro  5.6  /  Alb  2.8  /  TBili  0.4  /  DBili  x   /  AST  12  /  ALT  9   /  AlkPhos  68  06-20 @ 14:26    PT/INR - ( 06-20 @ 05:40 )   PT: 13.2 sec;   INR: 1.14 ratio    PTT - ( 06-20 @ 05:40 )  PTT:29.5 sec        MEDICATIONS  (STANDING):  chlorhexidine 2% Cloths 1 Application(s) Topical daily  clotrimazole 2% Vaginal Cream 1 Applicatorful Vaginal at bedtime  heparin   Injectable 5000 Unit(s) SubCutaneous every 12 hours  lactated ringers. 1000 milliLiter(s) (125 mL/Hr) IV Continuous <Continuous>  morphine  - Injectable 4 milliGRAM(s) IV Push once  piperacillin/tazobactam IVPB.. 3.375 Gram(s) IV Intermittent every 8 hours  prenatal multivitamin 1 Tablet(s) Oral daily    MEDICATIONS  (PRN):  acetaminophen     Tablet .. 650 milliGRAM(s) Oral every 6 hours PRN Mild Pain (1 - 3)  
R3 Antepartum Note, HD#5    Patient seen and examined at bedside, no acute overnight events. No acute complaints. Pt reports +FM, denies LOF, VB, ctx, HA, epigastric pain, blurred vision, CP, SOB, N/V, fevers, and chills.    Vital Signs Last 24 Hours  T(C): 36.9 (06-22-23 @ 05:38), Max: 37.1 (06-21-23 @ 09:26)  HR: 60 (06-22-23 @ 05:38) (60 - 80)  BP: 95/53 (06-22-23 @ 05:38) (83/51 - 97/59)  RR: 18 (06-22-23 @ 05:38) (18 - 20)  SpO2: 98% (06-22-23 @ 05:38) (97% - 99%)    CAPILLARY BLOOD GLUCOSE          Physical Exam:  General: NAD  Abdomen: Soft, non-tender, gravid  Ext: No pain or swelling      Labs:             10.1   10.26 )-----------( 289      ( 06-22 @ 05:49 )             30.8     06-21 @ 05:30    139  |  103  |  5   ----------------------------<  79  3.5   |  20  |  0.56    Ca    8.8      06-21 @ 05:30    TPro  5.9  /  Alb  2.6  /  TBili  0.4  /  DBili  x   /  AST  15  /  ALT  9   /  AlkPhos  85  06-21 @ 05:30    PT/INR - ( 06-20 @ 05:40 )   PT: 13.2 sec;   INR: 1.14 ratio    PTT - ( 06-20 @ 05:40 )  PTT:29.5 sec        MEDICATIONS  (STANDING):  chlorhexidine 2% Cloths 1 Application(s) Topical daily  heparin   Injectable 5000 Unit(s) SubCutaneous every 12 hours  morphine  - Injectable 4 milliGRAM(s) IV Push once  piperacillin/tazobactam IVPB.. 3.375 Gram(s) IV Intermittent every 8 hours  prenatal multivitamin 1 Tablet(s) Oral daily    MEDICATIONS  (PRN):  acetaminophen     Tablet .. 650 milliGRAM(s) Oral every 6 hours PRN Mild Pain (1 - 3)

## 2023-06-22 NOTE — DISCHARGE NOTE ANTEPARTUM - NS MD DC FALL RISK RISK
For information on Fall & Injury Prevention, visit: [email or web address] OR  [email or web address] OR  https://www.cdc.gov/steadi/patient.html

## 2023-06-22 NOTE — DISCHARGE NOTE ANTEPARTUM - CARE PLAN
1 Principal Discharge DX:	Abdominal pain  Assessment and plan of treatment:	- Follow up with Dr Michelle within one week of discharge  - Continue antibiotics as prescribed until completed  - Please return with increased pain, vomiting, contractions, vaginal bleeding, leaking fluid or decreased fetal movement

## 2023-06-22 NOTE — CHART NOTE - NSCHARTNOTEFT_GEN_A_CORE
Spoke to ID Dr. Barrera regarding PO antibiotics for home. Sensitivities for Enterococcus avium resulted, sensitive to amoxicillin. Pt may go home on Augmentin BID for 7 days.     Millie ROMAN-BC

## 2023-06-22 NOTE — PROGRESS NOTE ADULT - ASSESSMENT
18F 23.2 weeks gestation presenting with severe L sided abd pain, US with L ovarian cyst, s/p OR on 6/18 for diagnostic lap found to have diffuse purulence over anterior abd wall and anterior uterine wall. Evaluated by surgery intraop and with normal appendix, small bowel, gallbladder, stomach/ pylorus and visible portion of colon. Leukocytosis now resolved. No fevers. MRI with no abscess, cyst from the cul-de-sac to the L adnexa, R ovary not visualized, findings raise a question of R adnexal origin. HIV negative, urine gonorrhea/ chlamydia negative.    Recommend:  #Abd pain concerning for PID/ TOA  -Clinically improving  -F/U OR cultures - with E. avium S to amp  -Continue zosyn  -Monitor fever curve  -Trend WBC  -F/U blood cultures so far no growth  -On discharge can transition to oral augmentin 875 mg PO BID to complete 7 more days.    Frank Barrera MD  Available through MS Teams  If no response, or after 5pm/weekends, call [phone]    Plan discussed with primary team.

## 2023-06-24 LAB
CULTURE RESULTS: SIGNIFICANT CHANGE UP
CULTURE RESULTS: SIGNIFICANT CHANGE UP
SPECIMEN SOURCE: SIGNIFICANT CHANGE UP
SPECIMEN SOURCE: SIGNIFICANT CHANGE UP

## 2023-06-26 NOTE — CHART NOTE - NSCHARTNOTEFT_GEN_A_CORE
Spoke to Dr. Michelle regarding patient hospital course from last week. Patient was scheduled for her next appt for July 6th, spoke to  about changing apt to this week for patient to be followed. Patient now scheduled for appointment on 6/29/23, office to call patient and inform her of change.   Faxing hospital course and labs to Dr. Michelle at [phone].      Millie Vasquez [facility]

## 2023-06-29 ENCOUNTER — APPOINTMENT (OUTPATIENT)
Dept: ANTEPARTUM | Facility: CLINIC | Age: 18
End: 2023-06-29

## 2023-06-29 ENCOUNTER — INPATIENT (INPATIENT)
Facility: HOSPITAL | Age: 18
LOS: 2 days | Discharge: HOME | End: 2023-07-02
Attending: SPECIALIST | Admitting: SPECIALIST
Payer: MEDICAID

## 2023-06-29 ENCOUNTER — EMERGENCY (EMERGENCY)
Facility: HOSPITAL | Age: 18
LOS: 1 days | Discharge: STILL ADMITTED OR ADMINISTRATIVE | End: 2023-06-29
Admitting: EMERGENCY MEDICINE
Payer: COMMERCIAL

## 2023-06-29 VITALS
RESPIRATION RATE: 16 BRPM | TEMPERATURE: 98 F | DIASTOLIC BLOOD PRESSURE: 64 MMHG | SYSTOLIC BLOOD PRESSURE: 102 MMHG | HEART RATE: 80 BPM

## 2023-06-29 VITALS
TEMPERATURE: 98 F | HEART RATE: 103 BPM | OXYGEN SATURATION: 100 % | SYSTOLIC BLOOD PRESSURE: 109 MMHG | HEIGHT: 62 IN | DIASTOLIC BLOOD PRESSURE: 71 MMHG | RESPIRATION RATE: 16 BRPM

## 2023-06-29 DIAGNOSIS — Z98.890 OTHER SPECIFIED POSTPROCEDURAL STATES: Chronic | ICD-10-CM

## 2023-06-29 DIAGNOSIS — O26.899 OTHER SPECIFIED PREGNANCY RELATED CONDITIONS, UNSPECIFIED TRIMESTER: ICD-10-CM

## 2023-06-29 LAB
APPEARANCE UR: CLEAR — SIGNIFICANT CHANGE UP
BACTERIA # UR AUTO: NEGATIVE — SIGNIFICANT CHANGE UP
BASOPHILS # BLD AUTO: 0.1 K/UL — SIGNIFICANT CHANGE UP (ref 0–0.2)
BASOPHILS NFR BLD AUTO: 0.6 % — SIGNIFICANT CHANGE UP (ref 0–2)
BILIRUB UR-MCNC: NEGATIVE — SIGNIFICANT CHANGE UP
COLOR SPEC: YELLOW — SIGNIFICANT CHANGE UP
DIFF PNL FLD: NEGATIVE — SIGNIFICANT CHANGE UP
EOSINOPHIL # BLD AUTO: 0.18 K/UL — SIGNIFICANT CHANGE UP (ref 0–0.5)
EOSINOPHIL NFR BLD AUTO: 1.1 % — SIGNIFICANT CHANGE UP (ref 0–6)
EPI CELLS # UR: 5 /HPF — SIGNIFICANT CHANGE UP (ref 0–5)
GLUCOSE UR QL: NEGATIVE — SIGNIFICANT CHANGE UP
HCT VFR BLD CALC: 33.8 % — LOW (ref 34.5–45)
HGB BLD-MCNC: 11 G/DL — LOW (ref 11.5–15.5)
HYALINE CASTS # UR AUTO: 2 /LPF — SIGNIFICANT CHANGE UP (ref 0–7)
IANC: 12.73 K/UL — HIGH (ref 1.8–7.4)
IMM GRANULOCYTES NFR BLD AUTO: 1.6 % — HIGH (ref 0–0.9)
KETONES UR-MCNC: ABNORMAL
LEUKOCYTE ESTERASE UR-ACNC: ABNORMAL
LYMPHOCYTES # BLD AUTO: 15 % — SIGNIFICANT CHANGE UP (ref 13–44)
LYMPHOCYTES # BLD AUTO: 2.5 K/UL — SIGNIFICANT CHANGE UP (ref 1–3.3)
MCHC RBC-ENTMCNC: 25.2 PG — LOW (ref 27–34)
MCHC RBC-ENTMCNC: 32.5 GM/DL — SIGNIFICANT CHANGE UP (ref 32–36)
MCV RBC AUTO: 77.5 FL — LOW (ref 80–100)
MONOCYTES # BLD AUTO: 0.92 K/UL — HIGH (ref 0–0.9)
MONOCYTES NFR BLD AUTO: 5.5 % — SIGNIFICANT CHANGE UP (ref 2–14)
NEUTROPHILS # BLD AUTO: 12.73 K/UL — HIGH (ref 1.8–7.4)
NEUTROPHILS NFR BLD AUTO: 76.2 % — SIGNIFICANT CHANGE UP (ref 43–77)
NITRITE UR-MCNC: NEGATIVE — SIGNIFICANT CHANGE UP
NRBC # BLD: 0 /100 WBCS — SIGNIFICANT CHANGE UP (ref 0–0)
NRBC # FLD: 0 K/UL — SIGNIFICANT CHANGE UP (ref 0–0)
PH UR: 6.5 — SIGNIFICANT CHANGE UP (ref 5–8)
PLATELET # BLD AUTO: 479 K/UL — HIGH (ref 150–400)
PROT UR-MCNC: ABNORMAL
RBC # BLD: 4.36 M/UL — SIGNIFICANT CHANGE UP (ref 3.8–5.2)
RBC # FLD: 13.5 % — SIGNIFICANT CHANGE UP (ref 10.3–14.5)
RBC CASTS # UR COMP ASSIST: 2 /HPF — SIGNIFICANT CHANGE UP (ref 0–4)
RH IG SCN BLD-IMP: NEGATIVE — SIGNIFICANT CHANGE UP
SP GR SPEC: 1.02 — SIGNIFICANT CHANGE UP (ref 1.01–1.05)
UROBILINOGEN FLD QL: SIGNIFICANT CHANGE UP
WBC # BLD: 16.7 K/UL — HIGH (ref 3.8–10.5)
WBC # FLD AUTO: 16.7 K/UL — HIGH (ref 3.8–10.5)
WBC UR QL: 32 /HPF — HIGH (ref 0–5)

## 2023-06-29 PROCEDURE — L9996: CPT

## 2023-06-29 PROCEDURE — 86077 PHYS BLOOD BANK SERV XMATCH: CPT

## 2023-06-29 PROCEDURE — 99222 1ST HOSP IP/OBS MODERATE 55: CPT

## 2023-06-29 RX ORDER — SODIUM CHLORIDE 9 MG/ML
1000 INJECTION, SOLUTION INTRAVENOUS ONCE
Refills: 0 | Status: COMPLETED | OUTPATIENT
Start: 2023-06-29 | End: 2023-06-29

## 2023-06-29 RX ORDER — AMPICILLIN SODIUM AND SULBACTAM SODIUM 250; 125 MG/ML; MG/ML
INJECTION, POWDER, FOR SUSPENSION INTRAMUSCULAR; INTRAVENOUS
Refills: 0 | Status: DISCONTINUED | OUTPATIENT
Start: 2023-06-29 | End: 2023-07-02

## 2023-06-29 RX ORDER — AMPICILLIN SODIUM AND SULBACTAM SODIUM 250; 125 MG/ML; MG/ML
3 INJECTION, POWDER, FOR SUSPENSION INTRAMUSCULAR; INTRAVENOUS ONCE
Refills: 0 | Status: COMPLETED | OUTPATIENT
Start: 2023-06-29 | End: 2023-06-29

## 2023-06-29 RX ORDER — FOLIC ACID 0.8 MG
1 TABLET ORAL DAILY
Refills: 0 | Status: DISCONTINUED | OUTPATIENT
Start: 2023-06-29 | End: 2023-07-02

## 2023-06-29 RX ORDER — AMPICILLIN SODIUM AND SULBACTAM SODIUM 250; 125 MG/ML; MG/ML
3 INJECTION, POWDER, FOR SUSPENSION INTRAMUSCULAR; INTRAVENOUS EVERY 6 HOURS
Refills: 0 | Status: DISCONTINUED | OUTPATIENT
Start: 2023-06-30 | End: 2023-07-02

## 2023-06-29 RX ORDER — SODIUM CHLORIDE 9 MG/ML
1000 INJECTION, SOLUTION INTRAVENOUS
Refills: 0 | Status: DISCONTINUED | OUTPATIENT
Start: 2023-06-30 | End: 2023-07-02

## 2023-06-29 RX ORDER — SODIUM CHLORIDE 9 MG/ML
1000 INJECTION, SOLUTION INTRAVENOUS ONCE
Refills: 0 | Status: DISCONTINUED | OUTPATIENT
Start: 2023-06-29 | End: 2023-07-02

## 2023-06-29 RX ORDER — ACETAMINOPHEN 500 MG
1000 TABLET ORAL ONCE
Refills: 0 | Status: COMPLETED | OUTPATIENT
Start: 2023-06-29 | End: 2023-06-29

## 2023-06-29 RX ORDER — HEPARIN SODIUM 5000 [USP'U]/ML
5000 INJECTION INTRAVENOUS; SUBCUTANEOUS EVERY 12 HOURS
Refills: 0 | Status: DISCONTINUED | OUTPATIENT
Start: 2023-06-29 | End: 2023-06-30

## 2023-06-29 RX ADMIN — Medication 1000 MILLIGRAM(S): at 15:15

## 2023-06-29 RX ADMIN — SODIUM CHLORIDE 1000 MILLILITER(S): 9 INJECTION, SOLUTION INTRAVENOUS at 15:00

## 2023-06-29 RX ADMIN — AMPICILLIN SODIUM AND SULBACTAM SODIUM 200 GRAM(S): 250; 125 INJECTION, POWDER, FOR SUSPENSION INTRAMUSCULAR; INTRAVENOUS at 18:32

## 2023-06-29 RX ADMIN — Medication 1000 MILLIGRAM(S): at 14:37

## 2023-06-29 RX ADMIN — SODIUM CHLORIDE 1000 MILLILITER(S): 9 INJECTION, SOLUTION INTRAVENOUS at 22:13

## 2023-06-29 NOTE — CHART NOTE - NSCHARTNOTEFT_GEN_A_CORE
IR consulted for drainage of intra-abdominal abscess. Plan pending MRI results.    Mercy Hopper MD  IR Pager [phone]

## 2023-06-29 NOTE — CONSULT NOTE ADULT - SUBJECTIVE AND OBJECTIVE BOX
GIOVANNA CORTEZ  [id number]    Subjective  HPI: 19yo F  @24w6d POD#11 s/p Diagnostic laparoscopy and abdominal washout for acute abdominal pain, found to have pyoperitoneum. DDx includes PID vs chornic TOA. Patient was discharged on POD#4 and has been doing well at home. She is ambulating , tolerating PO, voiding without difficulty. She finished her Augmentin course yesterday.  4 days ago she noticed purulent drainage from her umbilical incision.  Also c/o intermittent sharp right lower abdominal pain.  Denies cramping, VB, LOF. Reports good FM. Denies nausea, vomiting, chest pain, SOB, diarrhea.            PNC: As above, with Dr. Michelle. Patient reports she has a f.u appt this coming Monday.   ObHx: G1  GynHx: L ovarian cyst; Denies fibroids, STIs  MedHx: Denies   Meds: PNV  All: NKDA  PSHx: Denies  Social: Denies alcohol/tobacco/drug use in pregnancy    Objective  – VS  T(C): 36.9 (23 @ 13:50)  HR: 75 (23 @ 13:51)  BP: 107/65 (23 @ 13:51)  RR: 16 (23 @ 12:16)  SpO2: 100% (23 @ 11:36)    – PE:   CV: RRR  Pulm: breathing comfortably on RA  Abd: gravid, +purulent drainage from umbilical incision site and tender to palpation of this area without erythema/warmth. 5cc drainage expressed at bedside.  Incision probed and fascia intact.  Cultures of drainage collected. Abdomen overall nontender . No uterine tenderness.   Extr: moving all extremities with ease    VE: 0/0/-3    – FHT: baseline 135, mod variability, +accels, -decels  – [place]: irregular ctx    Abdominal sono: 1x2cm hypoechoic fluid collection directly  below the umbilicus   TVUS: vertex, grossly normal fluid, CL 2.5cm     GIOVANNA CORTEZ  [id number]    Subjective  HPI: 17yo F  @24w6d POD#11 s/p Diagnostic laparoscopy and abdominal washout for acute abdominal pain, found to have pyoperitoneum. DDx includes PID vs chornic TOA. Patient was discharged on POD#4 and has been doing well at home. She is ambulating , tolerating PO, voiding without difficulty. She finished her Augmentin course yesterday.  4 days ago she noticed purulent drainage from her umbilical incision.  Also c/o intermittent sharp right lower abdominal pain.  Denies cramping, VB, LOF. Reports good FM. Denies nausea, vomiting, chest pain, SOB, diarrhea.            PNC: As above, with Dr. Michelle. Patient reports she has a f.u appt this coming Monday.   ObHx: G1  GynHx: L ovarian cyst; Denies fibroids, STIs  MedHx: Denies   Meds: PNV  All: NKDA  PSHx: Denies  Social: Denies alcohol/tobacco/drug use in pregnancy    Objective  – VS  T(C): 36.9 (23 @ 13:50)  HR: 75 (23 @ 13:51)  BP: 107/65 (23 @ 13:51)  RR: 16 (23 @ 12:16)  SpO2: 100% (23 @ 11:36)    – PE:   CV: RRR  Pulm: breathing comfortably on RA  Abd: gravid, +purulent drainage from umbilical incision site and tender to palpation of this area without erythema/warmth. 5cc drainage expressed at bedside.  Incision probed and fascia intact.  Cultures of drainage collected. Abdomen overall nontender . No uterine tenderness.   Extr: moving all extremities with ease    VE: 0/0/-3    – FHT: baseline 135, mod variability, +accels, -decels  – [place]: irregular ctx    Abdominal sono: 1x2cm hypoechoic fluid collection directly  below the umbilicus - additional hyperechoic areas underneath measuring aprox 3cm directly above the uterus  TVUS: vertex, grossly normal fluid, CL 2.5-3cm

## 2023-06-29 NOTE — OB PROVIDER H&P - ASSESSMENT
19yo F  @24w6d POD#11 s/p Diagnostic laparoscopy and abdominal washout for acute abdominal pain, found to have pyoperitoneum.  Patient was discharged on POD#4 and has been doing well at home. Presents today with purulent drainage from umbilical incision. Superficial fluid collection w/ visualized on sono underneath umbilical incision.  Abdominal exam overall benign.  Patient well appearing and afebrile with normal vital signs. Fetal status reassuring on NST. Patient with irregular  contractions. Admitted to antepartum for further management, IV abx, IR consult.    #r/o PTL  - VE closed/long  - CL 2.5-3cm  - IV fluid bolus  - continue to monitor for s/s PTL    #purulent drainage from surgical site  - VSS, patient afebrile   - Surgical site cultures collected and sent  - Mild leukocytosis, cont to trend, fever curve.  - MR abdomen/pelvis  - Appreciate IR consult for possible drainage  - Appreciate ID consult; prelim recs = Unasyn for IV abx    Pt seen and evaluated with Dr Nguyen PGY6 - D/w Dr Denis and  M.   BIN Desir-Gonzalez PGY2

## 2023-06-29 NOTE — ED ADULT TRIAGE NOTE - CHIEF COMPLAINT QUOTE
approx. 23 weeks pregnant, ROB 10/13, s/p abdominal surgery 10 days ago c/o abdominal pain, spoke to l&d patient can be seen upstairs

## 2023-06-29 NOTE — CONSULT NOTE ADULT - ATTENDING COMMENTS
MFM attg  Pt seen and evaluated, counseled by me  Afebrile, VSS   point tenderness just caudal to umbilicus over indurated area (no erythema)  On bedside sono indurated area corresponds to subcutaneous fluid collection which communicates/drains via the umbilicus, pululent fluid expressed from umbilicus. There is also a subfascial fluid collection appearing to be communicating with the subcutaneous collection. No intrauterine, intramyometrial or placental collections noted. Large unilocular cyst in the cul de sac - c/w know left ovarian cyst. Right ovary not seen. No fluid in cul de sac. Cervix is long.  FHR reactive, irreg mild ctxs  This appears to be recurrence of prior intraabdominal infection- The source of the initial infection is still unknown. Unlikely to be uterine/intraamniotic with closed cx and minimal ctxs. Bowel is possible source but no perforation noted at surgery last admission and no clear risk factors for microperforation/translocation.   Plan-   MRI for drainage assessment (d/w Dr Martinez)  ID consult

## 2023-06-29 NOTE — OB PROVIDER H&P - PRO PRENATAL RHOGAM YN INFANT
Anesthesia Evaluation     Patient summary reviewed and Nursing notes reviewed   NPO Solid Status: > 8 hours  NPO Liquid Status: > 8 hours     Airway   Mallampati: II  TM distance: >3 FB  Neck ROM: full  no difficulty expected  Dental      Pulmonary - normal exam   (+) pneumonia , sleep apnea,   Cardiovascular - normal exam    (+) hypertension, CAD, CABG, dysrhythmias Atrial Fib, PVD, hyperlipidemia      Neuro/Psych  (+) TIA, weakness, psychiatric history, dementia,    GI/Hepatic/Renal/Endo    (+)  hiatal hernia, GERD,     Musculoskeletal     Abdominal    Substance History      OB/GYN          Other      history of cancer                                    Anesthesia Plan    ASA 3     general   (Tap)  intravenous induction   Anesthetic plan and risks discussed with patient.    Plan discussed with CRNA.      
yes

## 2023-06-29 NOTE — OB RN PATIENT PROFILE - FALL HARM RISK - UNIVERSAL INTERVENTIONS
Abdomen soft, non-tender, no guarding.
Bed in lowest position, wheels locked, appropriate side rails in place/Call bell, personal items and telephone in reach/Instruct patient to call for assistance before getting out of bed or chair/Non-slip footwear when patient is out of bed/[place] to call system/Physically safe environment - no spills, clutter or unnecessary equipment/Purposeful Proactive Rounding/Room/bathroom lighting operational, light cord in reach

## 2023-06-29 NOTE — OB PROVIDER H&P - HISTORY OF PRESENT ILLNESS
OB H&P    GIOVANNA CORTEZ  [id number]    Subjective  HPI: 17yo F  @24w6d POD#11 s/p Diagnostic laparoscopy and abdominal washout for acute abdominal pain, found to have pyoperitoneum. DDx includes PID vs chornic TOA. Patient was discharged on POD#4 and has been doing well at home. She is ambulating , tolerating PO, voiding without difficulty. She finished her Augmentin course yesterday.  4 days ago she noticed purulent drainage from her umbilical incision.  Also c/o intermittent sharp right lower abdominal pain.  Denies cramping, VB, LOF. Reports good FM. Denies nausea, vomiting, chest pain, SOB, diarrhea.            PNC: As above, with Dr. Michelle. Patient reports she has a f.u appt this coming Monday.   ObHx: G1  GynHx: L ovarian cyst; Denies fibroids, STIs  MedHx: Denies   Meds: PNV  All: NKDA  PSHx: Denies  Social: Denies alcohol/tobacco/drug use in pregnancy    Objective  – VS  T(C): 36.9 (23 @ 13:50)  HR: 75 (23 @ 13:51)  BP: 107/65 (23 @ 13:51)  RR: 16 (23 @ 12:16)  SpO2: 100% (23 @ 11:36)    – PE:   CV: RRR  Pulm: breathing comfortably on RA  Abd: gravid, +purulent drainage from umbilical incision site and tender to palpation of this area without erythema/warmth. 5cc drainage expressed at bedside.  Incision probed and fascia intact.  Cultures of drainage collected. Abdomen overall nontender . No uterine tenderness.   Extr: moving all extremities with ease    VE: 0/0/-3    – FHT: baseline 135, mod variability, +accels, -decels  – [place]: irregular ctx    Abdominal sono: 1x2cm hypoechoic fluid collection directly  below the umbilicus   TVUS: vertex, grossly normal fluid, CL 2.5cm

## 2023-06-29 NOTE — OB PROVIDER TRIAGE NOTE - NSOBPROVIDERNOTE_OBGYN_ALL_OB_FT
17yo F  @24w6d POD#11 s/p Diagnostic laparoscopy and abdominal washout for acute abdominal pain, found to have pyoperitoneum.  Patient was discharged on POD#4 and has been doing well at home. Presents today with purulent drainage from umbilical incision. Small superficial fluid collection visualized on sono underneath umbilical incision.  Abdominal exam overall benign.  Patient well appearing and afebrile with normal vital signs. Fetal status reassuring on NST. Patient with irregular  contractions.    #r/o PTL  - VE closed/long  - CL 2.5cm  - IV fluid bolus  - continue to monitor for s/s PTL    #purulent drainage  - Cultures collected and sent  - CBC stat     Seen with Patrick PGY6, D/w Dr Cira Desir-Gonzalez PGY2

## 2023-06-29 NOTE — OB PROVIDER TRIAGE NOTE - HISTORY OF PRESENT ILLNESS
R2 Triage H&P    GIOVANNA CORTEZ  [id number]    Subjective  HPI: 17yo F  @24w6d POD#11 s/p Diagnostic laparoscopy and abdominal washout for acute abdominal pain, found to have pyoperitoneum. DDx includes PID vs chornic TOA. Patient was discharged on POD#4 and has been doing well at home. She is ambulating , tolerating PO, voiding without difficulty. She finished her Augmentin course yesterday.  4 days ago she noticed purulent drainage from her umbilical incision.  Also c/o intermittent sharp right lower abdominal pain.  Denies cramping, VB, LOF. Reports good FM. Denies nausea, vomiting, chest pain, SOB, diarrhea.            PNC: As above, with Dr. Michelle. Patient reports she has a f.u appt this coming Monday.   ObHx: G1  GynHx: L ovarian cyst; Denies fibroids, STIs  MedHx: Denies   Meds: PNV  All: NKDA  PSHx: Denies  Social: Denies alcohol/tobacco/drug use in pregnancy    Objective  – VS  T(C): 36.9 (23 @ 13:50)  HR: 75 (23 @ 13:51)  BP: 107/65 (23 @ 13:51)  RR: 16 (23 @ 12:16)  SpO2: 100% (23 @ 11:36)    – PE:   CV: RRR  Pulm: breathing comfortably on RA  Abd: gravid, +purulent drainage from umbilical incision site and tender to palpation of this area without erythema/warmth. 5cc drainage expressed at bedside.  Incision probed and fascia intact.  Cultures of drainage collected. Abdomen overall nontender . No uterine tenderness.   Extr: moving all extremities with ease    VE: 0/0/-3    – FHT: baseline 135, mod variability, +accels, -decels  – [place]: irregular ctx    Abdominal sono: 1x2cm hypoechoic fluid collection directly  below the umbilicus   TVUS: vertex, grossly normal fluid, CL 2.5cm

## 2023-06-29 NOTE — CONSULT NOTE ADULT - ASSESSMENT
17yo F  @24w6d POD#11 s/p Diagnostic laparoscopy and abdominal washout for acute abdominal pain, found to have pyoperitoneum.  Patient was discharged on POD#4 and has been doing well at home. Presents today with purulent drainage from umbilical incision. Small superficial fluid collection visualized on sono underneath umbilical incision.  Abdominal exam overall benign.  Patient well appearing and afebrile with normal vital signs. Fetal status reassuring on NST. Patient with irregular  contractions.    #r/o PTL  - VE closed/long  - CL 2.5cm  - IV fluid bolus  - continue to monitor for s/s PTL    #purulent drainage  - VSS, patient afebrile   - Surgical site cultures collected and sent  - Stat CBC     Pt seen and evaluated with Dr Nguyen PGY6  BIN Desir-Carlos PGY2 19yo F  @24w6d POD#11 s/p Diagnostic laparoscopy and abdominal washout for acute abdominal pain, found to have pyoperitoneum.  Patient was discharged on POD#4 and has been doing well at home. Presents today with purulent drainage from umbilical incision. Small superficial fluid collection visualized on sono underneath umbilical incision.  Abdominal exam overall benign.  Patient well appearing and afebrile with normal vital signs. Fetal status reassuring on NST. Patient with irregular  contractions.    #r/o PTL  - VE closed/long  - CL 2.5-3cm  - IV fluid bolus  - continue to monitor for s/s PTL    #purulent drainage  - VSS, patient afebrile   - Surgical site cultures collected and sent  - Stat CBC     Pt seen and evaluated with Dr Nguyen PGY6  BIN Desir-Gonzalez PGY2      [facility] Fellow Addendum    19yo P0 at 24w6d POD#11 s/p Diagnostic laparoscopy and abdominal washout for acute abdominal pain, found to have pyoperitoneum requiring IV abx eventually transitioned to PO and discharged on POD#4 presents with four days of periumbical pain and purluent discharge. VSS. Pt w/ cramping and periumbilical pain. WBC sig for 16. On BSUS what appears to be two collections underneath the umbilicus measuring 3cms which directly communicate with the umbilicus and are above the myometrium. Previous cultures grew out E. Avium sensitive to augmentin. Discussed case with IR attending Dr. Felipe who would prefer MRI abdomen and pelvis to assess drainability. Will reach out to ID to determine role of IV abx at this time. Will cnt to monitor closely.    Patient seen with Dr. Stone (M attending)    Osmany Nguyen M.D. FACOG PGY-6  Maternal Fetal Medicine Fellow  Cell: [phone] if after 5pm or weekend ask labor and delivery for on call fellow

## 2023-06-29 NOTE — OB RN TRIAGE NOTE - NSICDXPASTSURGICALHX_GEN_ALL_CORE_FT
PAST SURGICAL HISTORY:  No significant past surgical history      PAST SURGICAL HISTORY:  S/P laparoscopy

## 2023-06-30 ENCOUNTER — TRANSCRIPTION ENCOUNTER (OUTPATIENT)
Age: 18
End: 2023-06-30

## 2023-06-30 DIAGNOSIS — Z04.9 ENCOUNTER FOR EXAMINATION AND OBSERVATION FOR UNSPECIFIED REASON: ICD-10-CM

## 2023-06-30 LAB
ALBUMIN SERPL ELPH-MCNC: 3 G/DL — LOW (ref 3.3–5)
ALP SERPL-CCNC: 98 U/L — SIGNIFICANT CHANGE UP (ref 40–120)
ALT FLD-CCNC: 12 U/L — SIGNIFICANT CHANGE UP (ref 4–33)
ANION GAP SERPL CALC-SCNC: 11 MMOL/L — SIGNIFICANT CHANGE UP (ref 7–14)
APTT BLD: 28.4 SEC — SIGNIFICANT CHANGE UP (ref 27–36.3)
AST SERPL-CCNC: 12 U/L — SIGNIFICANT CHANGE UP (ref 4–32)
BILIRUB SERPL-MCNC: <0.2 MG/DL — SIGNIFICANT CHANGE UP (ref 0.2–1.2)
BUN SERPL-MCNC: 3 MG/DL — LOW (ref 7–23)
CALCIUM SERPL-MCNC: 9 MG/DL — SIGNIFICANT CHANGE UP (ref 8.4–10.5)
CHLORIDE SERPL-SCNC: 104 MMOL/L — SIGNIFICANT CHANGE UP (ref 98–107)
CO2 SERPL-SCNC: 24 MMOL/L — SIGNIFICANT CHANGE UP (ref 22–31)
CREAT SERPL-MCNC: 0.4 MG/DL — LOW (ref 0.5–1.3)
EGFR: 145 ML/MIN/1.73M2 — SIGNIFICANT CHANGE UP
GLUCOSE SERPL-MCNC: 72 MG/DL — SIGNIFICANT CHANGE UP (ref 70–99)
HCT VFR BLD CALC: 28.5 % — LOW (ref 34.5–45)
HGB BLD-MCNC: 9.4 G/DL — LOW (ref 11.5–15.5)
INR BLD: 1.02 RATIO — SIGNIFICANT CHANGE UP (ref 0.88–1.16)
MCHC RBC-ENTMCNC: 25.2 PG — LOW (ref 27–34)
MCHC RBC-ENTMCNC: 33 GM/DL — SIGNIFICANT CHANGE UP (ref 32–36)
MCV RBC AUTO: 76.4 FL — LOW (ref 80–100)
NRBC # BLD: 0 /100 WBCS — SIGNIFICANT CHANGE UP (ref 0–0)
NRBC # FLD: 0 K/UL — SIGNIFICANT CHANGE UP (ref 0–0)
PLATELET # BLD AUTO: 415 K/UL — HIGH (ref 150–400)
POTASSIUM SERPL-MCNC: 3.3 MMOL/L — LOW (ref 3.5–5.3)
POTASSIUM SERPL-SCNC: 3.3 MMOL/L — LOW (ref 3.5–5.3)
PROT SERPL-MCNC: 6.3 G/DL — SIGNIFICANT CHANGE UP (ref 6–8.3)
PROTHROM AB SERPL-ACNC: 11.8 SEC — SIGNIFICANT CHANGE UP (ref 10.5–13.4)
RBC # BLD: 3.73 M/UL — LOW (ref 3.8–5.2)
RBC # FLD: 13.6 % — SIGNIFICANT CHANGE UP (ref 10.3–14.5)
SODIUM SERPL-SCNC: 139 MMOL/L — SIGNIFICANT CHANGE UP (ref 135–145)
WBC # BLD: 12.74 K/UL — HIGH (ref 3.8–10.5)
WBC # FLD AUTO: 12.74 K/UL — HIGH (ref 3.8–10.5)

## 2023-06-30 PROCEDURE — 72195 MRI PELVIS W/O DYE: CPT | Mod: 26

## 2023-06-30 PROCEDURE — 74181 MRI ABDOMEN W/O CONTRAST: CPT | Mod: 26

## 2023-06-30 PROCEDURE — 99222 1ST HOSP IP/OBS MODERATE 55: CPT

## 2023-06-30 RX ORDER — VANCOMYCIN HCL 1 G
750 VIAL (EA) INTRAVENOUS EVERY 12 HOURS
Refills: 0 | Status: DISCONTINUED | OUTPATIENT
Start: 2023-06-30 | End: 2023-07-02

## 2023-06-30 RX ORDER — HEPARIN SODIUM 5000 [USP'U]/ML
5000 INJECTION INTRAVENOUS; SUBCUTANEOUS EVERY 12 HOURS
Refills: 0 | Status: DISCONTINUED | OUTPATIENT
Start: 2023-06-30 | End: 2023-07-02

## 2023-06-30 RX ORDER — ACETAMINOPHEN 500 MG
650 TABLET ORAL ONCE
Refills: 0 | Status: COMPLETED | OUTPATIENT
Start: 2023-06-30 | End: 2023-06-30

## 2023-06-30 RX ORDER — ACETAMINOPHEN 500 MG
1000 TABLET ORAL ONCE
Refills: 0 | Status: DISCONTINUED | OUTPATIENT
Start: 2023-06-30 | End: 2023-06-30

## 2023-06-30 RX ORDER — FOLIC ACID 0.8 MG
1 TABLET ORAL
Qty: 0 | Refills: 0 | DISCHARGE
Start: 2023-06-30

## 2023-06-30 RX ADMIN — SODIUM CHLORIDE 100 MILLILITER(S): 9 INJECTION, SOLUTION INTRAVENOUS at 00:06

## 2023-06-30 RX ADMIN — Medication 100 MILLIGRAM(S): at 15:04

## 2023-06-30 RX ADMIN — HEPARIN SODIUM 5000 UNIT(S): 5000 INJECTION INTRAVENOUS; SUBCUTANEOUS at 21:36

## 2023-06-30 RX ADMIN — AMPICILLIN SODIUM AND SULBACTAM SODIUM 200 GRAM(S): 250; 125 INJECTION, POWDER, FOR SUSPENSION INTRAMUSCULAR; INTRAVENOUS at 19:45

## 2023-06-30 RX ADMIN — Medication 650 MILLIGRAM(S): at 11:13

## 2023-06-30 RX ADMIN — AMPICILLIN SODIUM AND SULBACTAM SODIUM 200 GRAM(S): 250; 125 INJECTION, POWDER, FOR SUSPENSION INTRAMUSCULAR; INTRAVENOUS at 12:12

## 2023-06-30 RX ADMIN — Medication 260 MILLIGRAM(S): at 10:13

## 2023-06-30 RX ADMIN — AMPICILLIN SODIUM AND SULBACTAM SODIUM 200 GRAM(S): 250; 125 INJECTION, POWDER, FOR SUSPENSION INTRAMUSCULAR; INTRAVENOUS at 06:10

## 2023-06-30 RX ADMIN — HEPARIN SODIUM 5000 UNIT(S): 5000 INJECTION INTRAVENOUS; SUBCUTANEOUS at 00:04

## 2023-06-30 RX ADMIN — AMPICILLIN SODIUM AND SULBACTAM SODIUM 200 GRAM(S): 250; 125 INJECTION, POWDER, FOR SUSPENSION INTRAMUSCULAR; INTRAVENOUS at 00:06

## 2023-06-30 NOTE — PROGRESS NOTE ADULT - SUBJECTIVE AND OBJECTIVE BOX
Postpartum Note, HD#2    Interval events:    Patient seen and examined at bedside, no acute overnight events. No acute complaints. Denies HA  epigastric pain, blurred vision, CP, SOB, N/V, fevers, and chills.    Vital Signs Last 24 Hours  T(C): 36.8 (06-30-23 @ 05:35), Max: 36.9 (06-29-23 @ 13:50)  HR: 56 (06-30-23 @ 05:35) (56 - 103)  BP: 96/53 (06-30-23 @ 05:35) (96/53 - 109/71)  RR: 18 (06-30-23 @ 05:35) (16 - 18)  SpO2: 95% (06-30-23 @ 05:35) (95% - 100%)    Physical Exam:  General: NAD  Abdomen: Soft, +spontaneously draining purulent discharge from umbilical incision, overall non-tender, gravid, no uterine tenderness  Ext: No pain or swelling    Labs:             9.4    12.74 )-----------( 415      ( 06-30 @ 06:32 )             28.5           PT/INR - ( 06-30 @ 06:32 )   PT: 11.8 sec;   INR: 1.02 ratio    PTT - ( 06-30 @ 06:32 )  PTT:28.4 sec        MEDICATIONS  (STANDING):  acetaminophen     Tablet .. 1000 milliGRAM(s) Oral once  ampicillin/sulbactam  IVPB      ampicillin/sulbactam  IVPB 3 Gram(s) IV Intermittent every 6 hours  folic acid 1 milliGRAM(s) Oral daily  lactated ringers Bolus 1000 milliLiter(s) IV Bolus once  lactated ringers. 1000 milliLiter(s) (100 mL/Hr) IV Continuous <Continuous>  prenatal multivitamin 1 Tablet(s) Oral daily    MEDICATIONS  (PRN):

## 2023-06-30 NOTE — DISCHARGE NOTE ANTEPARTUM - MEDICATION SUMMARY - MEDICATIONS TO TAKE
I will START or STAY ON the medications listed below when I get home from the hospital:    acetaminophen 325 mg oral tablet  -- 2 tab(s) by mouth every 6 hours As needed Mild Pain (1 - 3)  -- Indication: For for pain    Prenatal Multivitamins oral tablet  -- 1 tablet by mouth once a day  -- Indication: For home med    ferrous sulfate 325 mg (65 mg elemental iron) oral tablet  -- 1 tab(s) by mouth once a day  -- Indication: For home med    amoxicillin-clavulanate 875 mg-125 mg oral tablet  -- 1 tab(s) by mouth 2 times a day MDD: 2 pills  -- Indication: For home med    folic acid 1 mg oral tablet  -- 1 tab(s) by mouth once a day  -- Indication: For home med    ascorbic acid 500 mg oral tablet  -- 1 tab(s) by mouth once a day  -- Indication: For home med

## 2023-06-30 NOTE — DISCHARGE NOTE ANTEPARTUM - HOSPITAL COURSE
Pt is  s/p Diagnostic laparoscopy and abdominal washout for acute abdominal pain, found to have pyoperitoneum.  Patient was discharged on POD#4 and represented on POD#11 with purulent drainage from umbilical incision. Superficial fluid collection w/ visualized on sono underneath umbilical incision.  Abdominal exam overall benign.  Patient well appearing and afebrile with normal vital signs. Fetal status reassuring on NST. Patient with irregular  contractions, ruled out for  labor. Admitted to antepartum for IV abx, MRI, IR consult. MR abdomen/pelvis showed small superficial collection. No IR intervention necessary in setting of spontaneous drainage.  ID was consulted and recommended ** for oral antibiotics. Patient discharged home on HD#2 with normal vital signs. Will follow up closely with Dr Michelle.      Pt is  s/p Diagnostic laparoscopy and abdominal washout for acute abdominal pain, found to have pyoperitoneum.  Patient was discharged on POD#4 and represented on POD#11 with purulent drainage from umbilical incision. Superficial fluid collection w/ visualized on sono underneath umbilical incision.  Abdominal exam overall benign.  Patient well appearing and afebrile with normal vital signs. Fetal status reassuring on NST. Patient with irregular  contractions, ruled out for  labor. Admitted to antepartum for IV abx, MRI, IR consult. MR abdomen/pelvis showed small superficial collection. No IR intervention necessary in setting of spontaneous drainage.  ID was consulted and recommended IV Unasyn and IV Vancomycin for the intra-abdominal collection. Wound cultures came back as positive for enterococcus avium, sensitive to Ampicillin. Per ID, patient was discharged home on Augmentin BID for 10 days for oral antibiotics. Patient discharged home on with normal vital signs. Will follow up closely with Dr Michelle.

## 2023-06-30 NOTE — DISCHARGE NOTE ANTEPARTUM - NS MD DC FALL RISK RISK
For information on Fall & Injury Prevention, visit: [email or web address] OR  [email or web address] OR  https://www.cdc.gov/steadi/patient.html

## 2023-06-30 NOTE — DISCHARGE NOTE ANTEPARTUM - PLAN OF CARE
Continue your *** for ** days as prescribed. Call your doctor if you experience any fevers greater than 100.4F or chills. Call your doctor if you have severe abdominal pain not improved with Tylenol,  regular contractions, vaginal bleeding, leakage of fluid or decreased fetal movement. Continue your Augmentin for 10 days as prescribed. Call your doctor if you experience any fevers greater than 100.4F or chills. Call your doctor if you have severe abdominal pain not improved with Tylenol,  regular contractions, vaginal bleeding, leakage of fluid or decreased fetal movement.

## 2023-06-30 NOTE — DISCHARGE NOTE ANTEPARTUM - CARE PLAN
1 Principal Discharge DX:	Surgical site infection  Assessment and plan of treatment:	Continue your *** for ** days as prescribed. Call your doctor if you experience any fevers greater than 100.4F or chills. Call your doctor if you have severe abdominal pain not improved with Tylenol,  regular contractions, vaginal bleeding, leakage of fluid or decreased fetal movement.   Principal Discharge DX:	Surgical site infection  Assessment and plan of treatment:	Continue your Augmentin for 10 days as prescribed. Call your doctor if you experience any fevers greater than 100.4F or chills. Call your doctor if you have severe abdominal pain not improved with Tylenol,  regular contractions, vaginal bleeding, leakage of fluid or decreased fetal movement.

## 2023-06-30 NOTE — PROGRESS NOTE ADULT - ASSESSMENT
19yo F  @24w6d POD#12 s/p Diagnostic laparoscopy and abdominal washout for acute abdominal pain, found to have pyoperitoneum.  Patient was discharged on POD#4 and has been doing well at home. Presents today with purulent drainage from umbilical incision. Superficial fluid collection w/ visualized on sono underneath umbilical incision.  Abdominal exam overall benign.  Patient well appearing and afebrile with normal vital signs. Fetal status reassuring on NST. Patient with irregular  contractions. Admitted to antepartum for further management, IV abx, IR consult. Stable overnight.     #r/o PTL  - VE  closed/long  - CL 2.5-3cm  - ctx improved s/p IV fluid bolus   - continue to monitor for s/s PTL    #purulent drainage from surgical site  - VSS, patient afebrile   - C/w Unasyn(-)  - Surgical site cultures collected and sent   - Mild leukocytosis downtrending , cont to trend, fever curve.  - MR abdomen/pelvis pending   - Appreciate IR consult for possible drainage  - Appreciate ID consult; prelim recs = Unasyn for IV abx      SErik Desir-Gonzalez PGY2     19yo F  @24w6d POD#12 s/p Diagnostic laparoscopy and abdominal washout for acute abdominal pain, found to have pyoperitoneum.  Patient was discharged on POD#4 and has been doing well at home. Presents today with purulent drainage from umbilical incision. Superficial fluid collection w/ visualized on sono underneath umbilical incision.  Abdominal exam overall benign.  Patient well appearing and afebrile with normal vital signs. Fetal status reassuring on NST. Patient with irregular  contractions. Admitted to antepartum for further management, IV abx, IR consult. Stable overnight.     #r/o PTL  - VE  closed/long  - CL 2.5-3cm  - ctx improved s/p IV fluid bolus   - continue to monitor for s/s PTL    #purulent drainage from surgical site  - VSS, patient afebrile   - C/w Unasyn(-)  - Surgical site cultures collected and sent   - Mild leukocytosis downtrending , cont to trend, fever curve.  - MR abdomen/pelvis pending   - Appreciate IR consult for possible drainage  - Appreciate ID consult; prelim recs = Unasyn for IV abx      SErik Desir-Gonzalez PGY2    MFM Fellow Addendum    19yo P0 at 25w0d POD#12 s/p Diagnostic laparoscopy and abdominal washout for acute abdominal pain, found to have pyoperitoneum requiring IV abx eventually transitioned to PO and discharged on POD#4 presented with four days of periumbical pain and purluent discharge now on IV unasyn and continues with purulent drainage. VSS. Asxs. Abd pain improved from yesterday. Wound cultures pending. MRI with small collection noted appears smaller than ultrasound imaging yesterday. Discussed case with IR no role for drainage as it is improving spontaneously. Per ID will continue with IV abx for now and await cultures. EFM reassuring. Will continue to monitor closely.    Patient seen with Dr. Charlton (M attending)    Osmany Nguyen M.D. FACOG PGY-6  Maternal Fetal Medicine Fellow  Cell: [phone] if after 5pm or weekend ask labor and delivery for on call fellow

## 2023-06-30 NOTE — DISCHARGE NOTE ANTEPARTUM - PROVIDER TOKENS
FREE:[LAST:[Chatta],FIRST:[Barbara],PHONE:[phone]],FAX:[(   )    -],ADDRESS:[[address] [place] [address], [facility]],FOLLOWUP:[1 week]]

## 2023-06-30 NOTE — CONSULT NOTE ADULT - ASSESSMENT
Interventional Radiology    Evaluate for Procedure: collection drainage    HPI: 18F POD#11 s/p Diagnostic laparoscopy and abdominal washout for acute abdominal pain, found to have pyoperitoneum requiring IV abx eventually transitioned to PO and discharged on POD#4 presents with periumbical pain and purluent discharge and leukocytosis.     Allergies: No Known Allergies    Medications (Abx/Cardiac/Anticoagulation/Blood Products)    ampicillin/sulbactam  IVPB: 200 mL/Hr IV Intermittent (06-29 @ 18:32)  ampicillin/sulbactam  IVPB: 200 mL/Hr IV Intermittent (06-30 @ 12:12)  heparin   Injectable: 5000 Unit(s) SubCutaneous (06-30 @ 00:04)    Data:  157.5  43  T(C): 36.9  HR: 72  BP: 102/58  RR: 16  SpO2: 99%    -WBC 12.74 / HgB 9.4 / Hct 28.5 / Plt 415  -Na 139 / Cl 103 / BUN 5 / Glucose 79  -K 3.5 / CO2 20 / Cr 0.56  -ALT 9 / Alk Phos 85 / T.Bili 0.4  -INR 1.02 / PTT 28.4      Radiology: reviewed    Assessment/Plan: 18F POD#11 s/p Diagnostic laparoscopy and abdominal washout for acute abdominal pain, found to have pyoperitoneum requiring IV abx eventually transitioned to PO and discharged on POD#4 presents with periumbical pain and purluent discharge and leukocytosis. IR consulted for periumbilical collection drainage.    -- MRI reviewed. Collection too small for percutaneous drainage.     --  Mercy Hopper MD  IR Pager [phone]

## 2023-06-30 NOTE — CONSULT NOTE ADULT - ASSESSMENT
18 year od female 24 weeks pregnant recently admitted for diagnostic lap and abd washout for acute abd pain found to have pyoperitoneum possibly PID vs TOA, discharged home on augmentin which she completed. now with purulent drainage from her umbilicus incision. No fevers. Leukocytosis. MRI with focal fluid collection.    Recommend:  #Purulent drainage from surgical site, wound infection  -Check blood cultures x 2 sets  -Continue unasyn 3 grams IV q 6 hours  -Given purulent drainage, would add vancomycin 750 mg IV q 12 hours  -Monitor  vanco trough  -F/U abscess culture - will tailor abx based on culture data  -MRI with focal fluid collection, F/U IR for possible drainage  -Trend WBC  -Monitor for fevers  -monitor renal function while on abx    Frank Barrera MD  Available through MS Teams  If no response, or after 5pm/weekends, call [phone]

## 2023-06-30 NOTE — CONSULT NOTE ADULT - SUBJECTIVE AND OBJECTIVE BOX
HPI: 17yo F  @24w6d POD#11 s/p Diagnostic laparoscopy and abdominal washout for acute abdominal pain, found to have pyoperitoneum. DDx includes PID vs chornic TOA. Patient was discharged on POD#4 and has been doing well at home. She is ambulating , tolerating PO, voiding without difficulty. She finished her Augmentin course yesterday.  4 days ago she noticed purulent drainage from her umbilical incision.  Also c/o intermittent sharp right lower abdominal pain.  Denies cramping, VB, LOF. Reports good FM. Denies nausea, vomiting, chest pain, SOB, diarrhea.            PAST MEDICAL & SURGICAL HISTORY:  No pertinent past medical history      S/P laparoscopy    Allergies    No Known Allergies    Intolerances    ANTIMICROBIALS:  ampicillin/sulbactam  IVPB    ampicillin/sulbactam  IVPB 3 every 6 hours      OTHER MEDS:  folic acid 1 milliGRAM(s) Oral daily  lactated ringers Bolus 1000 milliLiter(s) IV Bolus once  lactated ringers. 1000 milliLiter(s) IV Continuous <Continuous>  prenatal multivitamin 1 Tablet(s) Oral daily      SOCIAL HISTORY: Denies smoking, alcohol, drug use.    FAMILY HISTORY: No pertinent medical issues in first degree relatives      Drug Dosing Weight  Height (cm): 157.5 (2023 17:35)  Weight (kg): 43 (2023 17:35)  BMI (kg/m2): 17.3 (2023 17:35)  BSA (m2): 1.39 (2023 17:35)    PE:    Vital Signs Last 24 Hrs  T(C): 36.9 (2023 09:47), Max: 36.9 (2023 13:50)  T(F): 98.4 (2023 09:47), Max: 98.42 (2023 13:50)  HR: 72 (2023 09:47) (56 - 96)  BP: 102/58 (2023 09:47) (96/53 - 107/65)  BP(mean): --  RR: 16 (2023 09:47) (16 - 18)  SpO2: 99% (2023 09:47) (95% - 100%)    Parameters below as of 2023 09:47  Patient On (Oxygen Delivery Method): room air    Gen: AOx3, NAD  CV: S1+S2 normal, no murmurs  Resp: Clear bilat, no resp distress  Abd: Soft, nontender, +BS  Ext: No LE edema, no wounds  : No Goldman  IV/Skin: No thrombophlebitis, purulent drainage from umbilicus, other surgical sites C/D/I  Msk: No low back pain, no arthralgias, no joint swelling  Neuro: No sensory deficits, no motor deficits    LABS:                          9.4    12.74 )-----------( 415      ( 2023 06:32 )             28.5               Urinalysis Basic - ( 2023 18:37 )    Color: Yellow / Appearance: Clear / S.023 / pH: x  Gluc: x / Ketone: Large  / Bili: Negative / Urobili: <2 mg/dL   Blood: x / Protein: Trace / Nitrite: Negative   Leuk Esterase: Large / RBC: 2 /HPF / WBC 32 /HPF   Sq Epi: x / Non Sq Epi: x / Bacteria: Negative        MICROBIOLOGY:  v  Clean Catch Clean Catch (Midstream)  23   <10,000 CFU/mL Normal Urogenital Kamran  --  --      .Blood Blood-Peripheral  23   No Growth Final  --  --      .Blood Blood-Peripheral  23   No Growth Final  --  --      .Genital  23   Routine vaginal kamran isolated  --  --      .Other ANTERIOR WALL UTERINE  23   No fungus isolated at 1 week.  --  Enterococcus avium      .Body Fluid PELVIC FLUID  23   No growth  --    polymorphonuclear leukocytes seen  No organisms seen  by cytocentrifuge      .Other VAGINAL  23   No acid-fast bacilli isolated at 1 week. ****Acid-fast cultures are held  for 6 weeks.****  --    No polymorphonuclear leukocytes per low power field  Moderate Gram Negative Rods per oil power field    RADIOLOGY:    < from: MR Pelvis No Cont (23 @ 09:20) >  IMPRESSION:  Focal fluid collection measuring 1.5 x 0.8cm at the umbilicus.    No intraperitoneal fluid collection to suggest abscess.    Cyst extending from the cul-de-sac to the left adnexa measuring 7.5 cm,   unchanged. The right ovary is not discretely visualized, question of   right adnexal origin.    < end of copied text >

## 2023-06-30 NOTE — DISCHARGE NOTE ANTEPARTUM - PATIENT PORTAL LINK FT
You can access the FollowMyHealth Patient Portal offered by [facility] by registering at the following website: [email or web address]. By joining [employer]’s FollowMyHealth portal, you will also be able to view your health information using other applications (apps) compatible with our system.

## 2023-07-01 LAB
SPECIMEN SOURCE: SIGNIFICANT CHANGE UP
T VAGINALIS RRNA SPEC QL NAA+PROBE: SIGNIFICANT CHANGE UP

## 2023-07-01 PROCEDURE — 99232 SBSQ HOSP IP/OBS MODERATE 35: CPT

## 2023-07-01 PROCEDURE — 99232 SBSQ HOSP IP/OBS MODERATE 35: CPT | Mod: GC

## 2023-07-01 RX ORDER — CHLORHEXIDINE GLUCONATE 213 G/1000ML
1 SOLUTION TOPICAL DAILY
Refills: 0 | Status: DISCONTINUED | OUTPATIENT
Start: 2023-07-01 | End: 2023-07-02

## 2023-07-01 RX ADMIN — AMPICILLIN SODIUM AND SULBACTAM SODIUM 200 GRAM(S): 250; 125 INJECTION, POWDER, FOR SUSPENSION INTRAMUSCULAR; INTRAVENOUS at 23:41

## 2023-07-01 RX ADMIN — Medication 100 MILLIGRAM(S): at 04:06

## 2023-07-01 RX ADMIN — HEPARIN SODIUM 5000 UNIT(S): 5000 INJECTION INTRAVENOUS; SUBCUTANEOUS at 10:39

## 2023-07-01 RX ADMIN — AMPICILLIN SODIUM AND SULBACTAM SODIUM 200 GRAM(S): 250; 125 INJECTION, POWDER, FOR SUSPENSION INTRAMUSCULAR; INTRAVENOUS at 17:26

## 2023-07-01 RX ADMIN — AMPICILLIN SODIUM AND SULBACTAM SODIUM 200 GRAM(S): 250; 125 INJECTION, POWDER, FOR SUSPENSION INTRAMUSCULAR; INTRAVENOUS at 11:57

## 2023-07-01 RX ADMIN — Medication 1 TABLET(S): at 10:50

## 2023-07-01 RX ADMIN — AMPICILLIN SODIUM AND SULBACTAM SODIUM 200 GRAM(S): 250; 125 INJECTION, POWDER, FOR SUSPENSION INTRAMUSCULAR; INTRAVENOUS at 00:06

## 2023-07-01 RX ADMIN — Medication 1 MILLIGRAM(S): at 10:50

## 2023-07-01 RX ADMIN — HEPARIN SODIUM 5000 UNIT(S): 5000 INJECTION INTRAVENOUS; SUBCUTANEOUS at 21:05

## 2023-07-01 RX ADMIN — CHLORHEXIDINE GLUCONATE 1 APPLICATION(S): 213 SOLUTION TOPICAL at 11:00

## 2023-07-01 RX ADMIN — Medication 100 MILLIGRAM(S): at 16:12

## 2023-07-01 RX ADMIN — AMPICILLIN SODIUM AND SULBACTAM SODIUM 200 GRAM(S): 250; 125 INJECTION, POWDER, FOR SUSPENSION INTRAMUSCULAR; INTRAVENOUS at 06:09

## 2023-07-01 NOTE — PROGRESS NOTE ADULT - ASSESSMENT
18 year od female 24 weeks pregnant recently admitted for diagnostic lap and abd washout for acute abd pain found to have pyoperitoneum possibly PID vs TOA, discharged home on augmentin which she completed. now with purulent drainage from her umbilicus incision. No fevers. Leukocytosis. MRI with focal fluid collection.    #Purulent drainage from surgical site, wound infection    feels better   afebrile     on empiric vanco and unasyn  vanco trough pending   blood and wound cultures are testing   would tailor abx based on culture data

## 2023-07-01 NOTE — PROGRESS NOTE ADULT - SUBJECTIVE AND OBJECTIVE BOX
R3 Antepartum Note, HD#3    Patient seen and examined at bedside, no acute overnight events. No acute complaints. Pt reports +FM, denies LOF, VB, ctx, HA, epigastric pain, blurred vision, CP, SOB, N/V, fevers, and chills.    Vital Signs Last 24 Hours  T(C): 36.6 (07-01-23 @ 06:29), Max: 36.9 (06-30-23 @ 09:47)  HR: 77 (07-01-23 @ 06:29) (70 - 77)  BP: 95/52 (07-01-23 @ 06:29) (94/55 - 102/58)  RR: 16 (07-01-23 @ 06:29) (16 - 18)  SpO2: 100% (07-01-23 @ 06:29) (99% - 100%)    CAPILLARY BLOOD GLUCOSE          Physical Exam:  General: NAD  Abdomen: Soft, non-tender, gravid  Ext: No pain or swelling    NST reactive overnight    Labs:             9.4    12.74 )-----------( 415      ( 06-30 @ 06:32 )             28.5     06-30 @ 18:25    139  |  104  |  3   ----------------------------<  72  3.3   |  24  |  0.40    Ca    9.0      06-30 @ 18:25    TPro  6.3  /  Alb  3.0  /  TBili  <0.2  /  DBili  x   /  AST  12  /  ALT  12  /  AlkPhos  98  06-30 @ 18:25    PT/INR - ( 06-30 @ 06:32 )   PT: 11.8 sec;   INR: 1.02 ratio    PTT - ( 06-30 @ 06:32 )  PTT:28.4 sec        MEDICATIONS  (STANDING):  ampicillin/sulbactam  IVPB      ampicillin/sulbactam  IVPB 3 Gram(s) IV Intermittent every 6 hours  folic acid 1 milliGRAM(s) Oral daily  heparin   Injectable 5000 Unit(s) SubCutaneous every 12 hours  lactated ringers Bolus 1000 milliLiter(s) IV Bolus once  lactated ringers. 1000 milliLiter(s) (100 mL/Hr) IV Continuous <Continuous>  prenatal multivitamin 1 Tablet(s) Oral daily  vancomycin  IVPB 750 milliGRAM(s) IV Intermittent every 12 hours    MEDICATIONS  (PRN):

## 2023-07-01 NOTE — PROGRESS NOTE ADULT - ASSESSMENT
17yo F  @25w+1d POD#13 s/p Diagnostic laparoscopy and abdominal washout for acute abdominal pain, found to have pyoperitoneum.  Patient was discharged on POD#4 and has been doing well at home. Presented with purulent drainage from umbilical incision. Superficial fluid collection w/ visualized on sono underneath umbilical incision.  Abdominal exam overall benign.  Patient well appearing and afebrile with normal vital signs. Fetal status reassuring on NST. Patient with irregular  contractions. Admitted to antepartum for further management, IV abx, IR consult. Stable overnight.     #r/o PTL  - VE  closed/long  - CL 2.5-3cm  - ctx improved s/p IV fluid bolus   - continue to monitor for s/s PTL    #purulent drainage from surgical site  - VSS, patient afebrile   - C/w Unasyn(-), Vanc (-)  - Surgical site cultures collected and sent   - Mild leukocytosis downtrending , cont to trend, fever curve.  - MR abdomen/pelvis: Small amount of free fluid in the pelvis, no evidence of abscess  - Appreciate IR consult: Collection too small for percutaneous drainage  - Appreciate ID consult = Unasyn and Vanc for IV abx    AMANDA Santacruz PGY2

## 2023-07-02 VITALS
HEART RATE: 78 BPM | RESPIRATION RATE: 17 BRPM | OXYGEN SATURATION: 100 % | TEMPERATURE: 98 F | DIASTOLIC BLOOD PRESSURE: 60 MMHG | SYSTOLIC BLOOD PRESSURE: 101 MMHG

## 2023-07-02 LAB
-  AMPICILLIN: SIGNIFICANT CHANGE UP
-  AMPICILLIN: SIGNIFICANT CHANGE UP
-  TETRACYCLINE: SIGNIFICANT CHANGE UP
-  TETRACYCLINE: SIGNIFICANT CHANGE UP
-  VANCOMYCIN: SIGNIFICANT CHANGE UP
-  VANCOMYCIN: SIGNIFICANT CHANGE UP
ALBUMIN SERPL ELPH-MCNC: 2.7 G/DL — LOW (ref 3.3–5)
ALP SERPL-CCNC: 91 U/L — SIGNIFICANT CHANGE UP (ref 40–120)
ALT FLD-CCNC: 16 U/L — SIGNIFICANT CHANGE UP (ref 4–33)
ANION GAP SERPL CALC-SCNC: 11 MMOL/L — SIGNIFICANT CHANGE UP (ref 7–14)
AST SERPL-CCNC: 17 U/L — SIGNIFICANT CHANGE UP (ref 4–32)
BILIRUB SERPL-MCNC: <0.2 MG/DL — SIGNIFICANT CHANGE UP (ref 0.2–1.2)
BLD GP AB SCN SERPL QL: POSITIVE — SIGNIFICANT CHANGE UP
BUN SERPL-MCNC: 4 MG/DL — LOW (ref 7–23)
CALCIUM SERPL-MCNC: 8.1 MG/DL — LOW (ref 8.4–10.5)
CHLORIDE SERPL-SCNC: 106 MMOL/L — SIGNIFICANT CHANGE UP (ref 98–107)
CO2 SERPL-SCNC: 21 MMOL/L — LOW (ref 22–31)
CREAT SERPL-MCNC: 0.45 MG/DL — LOW (ref 0.5–1.3)
CULTURE RESULTS: SIGNIFICANT CHANGE UP
CULTURE RESULTS: SIGNIFICANT CHANGE UP
EGFR: 141 ML/MIN/1.73M2 — SIGNIFICANT CHANGE UP
FERRITIN SERPL-MCNC: 99 NG/ML — SIGNIFICANT CHANGE UP (ref 15–150)
GLUCOSE SERPL-MCNC: 76 MG/DL — SIGNIFICANT CHANGE UP (ref 70–99)
HBV SURFACE AG SERPL QL IA: SIGNIFICANT CHANGE UP
HCT VFR BLD CALC: 26.6 % — LOW (ref 34.5–45)
HGB BLD-MCNC: 8.1 G/DL — LOW (ref 11.5–15.5)
MCHC RBC-ENTMCNC: 24.9 PG — LOW (ref 27–34)
MCHC RBC-ENTMCNC: 30.5 GM/DL — LOW (ref 32–36)
MCV RBC AUTO: 81.8 FL — SIGNIFICANT CHANGE UP (ref 80–100)
METHOD TYPE: SIGNIFICANT CHANGE UP
METHOD TYPE: SIGNIFICANT CHANGE UP
NRBC # BLD: 0 /100 WBCS — SIGNIFICANT CHANGE UP (ref 0–0)
NRBC # FLD: 0 K/UL — SIGNIFICANT CHANGE UP (ref 0–0)
ORGANISM # SPEC MICROSCOPIC CNT: SIGNIFICANT CHANGE UP
PLATELET # BLD AUTO: 407 K/UL — HIGH (ref 150–400)
POTASSIUM SERPL-MCNC: 3.3 MMOL/L — LOW (ref 3.5–5.3)
POTASSIUM SERPL-SCNC: 3.3 MMOL/L — LOW (ref 3.5–5.3)
PROT SERPL-MCNC: 6.1 G/DL — SIGNIFICANT CHANGE UP (ref 6–8.3)
RBC # BLD: 3.25 M/UL — LOW (ref 3.8–5.2)
RBC # FLD: 14 % — SIGNIFICANT CHANGE UP (ref 10.3–14.5)
RH IG SCN BLD-IMP: NEGATIVE — SIGNIFICANT CHANGE UP
SODIUM SERPL-SCNC: 138 MMOL/L — SIGNIFICANT CHANGE UP (ref 135–145)
SPECIMEN SOURCE: SIGNIFICANT CHANGE UP
SPECIMEN SOURCE: SIGNIFICANT CHANGE UP
VANCOMYCIN TROUGH SERPL-MCNC: <4 UG/ML — LOW (ref 10–20)
WBC # BLD: 9.3 K/UL — SIGNIFICANT CHANGE UP (ref 3.8–10.5)
WBC # FLD AUTO: 9.3 K/UL — SIGNIFICANT CHANGE UP (ref 3.8–10.5)

## 2023-07-02 PROCEDURE — 86077 PHYS BLOOD BANK SERV XMATCH: CPT

## 2023-07-02 PROCEDURE — 99238 HOSP IP/OBS DSCHRG MGMT 30/<: CPT | Mod: GC

## 2023-07-02 PROCEDURE — 99232 SBSQ HOSP IP/OBS MODERATE 35: CPT

## 2023-07-02 RX ORDER — POTASSIUM CHLORIDE 20 MEQ
10 PACKET (EA) ORAL
Refills: 0 | Status: DISCONTINUED | OUTPATIENT
Start: 2023-07-02 | End: 2023-07-02

## 2023-07-02 RX ORDER — POTASSIUM CHLORIDE 20 MEQ
20 PACKET (EA) ORAL
Refills: 0 | Status: COMPLETED | OUTPATIENT
Start: 2023-07-02 | End: 2023-07-02

## 2023-07-02 RX ORDER — MAGNESIUM SULFATE 500 MG/ML
2 VIAL (ML) INJECTION ONCE
Refills: 0 | Status: COMPLETED | OUTPATIENT
Start: 2023-07-02 | End: 2023-07-02

## 2023-07-02 RX ADMIN — Medication 25 GRAM(S): at 11:01

## 2023-07-02 RX ADMIN — Medication 1 TABLET(S): at 19:01

## 2023-07-02 RX ADMIN — Medication 1 MILLIGRAM(S): at 11:01

## 2023-07-02 RX ADMIN — AMPICILLIN SODIUM AND SULBACTAM SODIUM 200 GRAM(S): 250; 125 INJECTION, POWDER, FOR SUSPENSION INTRAMUSCULAR; INTRAVENOUS at 12:07

## 2023-07-02 RX ADMIN — Medication 20 MILLIEQUIVALENT(S): at 12:08

## 2023-07-02 RX ADMIN — Medication 20 MILLIEQUIVALENT(S): at 11:01

## 2023-07-02 RX ADMIN — Medication 1 TABLET(S): at 11:01

## 2023-07-02 RX ADMIN — CHLORHEXIDINE GLUCONATE 1 APPLICATION(S): 213 SOLUTION TOPICAL at 12:06

## 2023-07-02 RX ADMIN — HEPARIN SODIUM 5000 UNIT(S): 5000 INJECTION INTRAVENOUS; SUBCUTANEOUS at 11:05

## 2023-07-02 RX ADMIN — Medication 100 MILLIGRAM(S): at 03:00

## 2023-07-02 RX ADMIN — AMPICILLIN SODIUM AND SULBACTAM SODIUM 200 GRAM(S): 250; 125 INJECTION, POWDER, FOR SUSPENSION INTRAMUSCULAR; INTRAVENOUS at 05:54

## 2023-07-02 NOTE — PROGRESS NOTE ADULT - SUBJECTIVE AND OBJECTIVE BOX
Follow Up:  purulent drainage from umbilicus     Interval History/ROS:   feels better   still some drainage form umbilicus      Allergies  No Known Allergies        ANTIMICROBIALS:  ampicillin/sulbactam  IVPB    ampicillin/sulbactam  IVPB 3 every 6 hours      OTHER MEDS:  chlorhexidine 2% Cloths 1 Application(s) Topical daily  folic acid 1 milliGRAM(s) Oral daily  heparin   Injectable 5000 Unit(s) SubCutaneous every 12 hours  lactated ringers Bolus 1000 milliLiter(s) IV Bolus once  lactated ringers. 1000 milliLiter(s) IV Continuous <Continuous>  prenatal multivitamin 1 Tablet(s) Oral daily      Vital Signs Last 24 Hrs  T(C): 37.2 (02 Jul 2023 13:37), Max: 37.2 (01 Jul 2023 21:28)  T(F): 98.96 (02 Jul 2023 13:37), Max: 98.96 (02 Jul 2023 13:37)  HR: 60 (02 Jul 2023 13:37) (55 - 74)  BP: 103/59 (02 Jul 2023 13:37) (88/50 - 103/59)  BP(mean): --  RR: 16 (02 Jul 2023 09:25) (16 - 19)  SpO2: 98% (02 Jul 2023 13:37) (98% - 99%)    Parameters below as of 02 Jul 2023 09:25  Patient On (Oxygen Delivery Method): room air        PHYSICAL EXAM:  Constitutional: Not in acute distress  Eyes: No icterus.  Oral cavity: Clear, no lesions  Neck: Supple  RS: Chest clear   CVS: S1, S2   Abdomen: Soft, gravid, small wound superior umbilicus closed,   umbilicus with small amount fluid pooling   : no henderson   Neuro: Alert, oriented to time/place/person  Cranial nerves 2-12 grossly normal. No focal abnormalities                          8.1    9.30  )-----------( 407      ( 02 Jul 2023 05:50 )             26.6       07-02    138  |  106  |  4<L>  ----------------------------<  76  3.3<L>   |  21<L>  |  0.45<L>    Ca    8.1<L>      02 Jul 2023 05:50    TPro  6.1  /  Alb  2.7<L>  /  TBili  <0.2  /  DBili  x   /  AST  17  /  ALT  16  /  AlkPhos  91  07-02      Urinalysis Basic - ( 02 Jul 2023 05:50 )    Color: x / Appearance: x / SG: x / pH: x  Gluc: 76 mg/dL / Ketone: x  / Bili: x / Urobili: x   Blood: x / Protein: x / Nitrite: x   Leuk Esterase: x / RBC: x / WBC x   Sq Epi: x / Non Sq Epi: x / Bacteria: x        MICROBIOLOGY:  Vancomycin Level, Trough: <4.0 ug/mL (07-02-23 @ 02:41)  v  .Blood Blood-Peripheral  06-30-23   No growth at 24 hours  --  --      .Blood Blood-Peripheral  06-30-23   No growth at 24 hours  --  --      Wound Wound  06-29-23   Few Enterococcus avium  --  Enterococcus avium  S amp      Clean Catch Clean Catch (Midstream)  06-19-23   <10,000 CFU/mL Normal Urogenital Kamran  --  --      .Blood Blood-Peripheral  06-18-23   No Growth Final  --  --      .Blood Blood-Peripheral  06-18-23   No Growth Final  --  --      .Genital  06-18-23   Routine vaginal kamran isolated  --  --      .Other ANTERIOR WALL UTERINE  06-18-23   No fungus isolated at 1 week.  --  Enterococcus avium      .Body Fluid PELVIC FLUID  06-18-23   No growth  --    polymorphonuclear leukocytes seen  No organisms seen  by cytocentrifuge      .Other VAGINAL  06-18-23   No acid-fast bacilli isolated at 1 week. ****Acid-fast cultures are held  for 6 weeks.****  --    No polymorphonuclear leukocytes per low power field  Moderate Gram Negative Rods per oil power field        RADIOLOGY:    < from: MR Pelvis No Cont (06.30.23 @ 09:20) >    ACC: [id number] EXAM:  MR PELVIS   ORDERED BY: FAIZA MALDONADO     ACC: [id number] EXAM:  MR ABDOMEN   ORDERED BY: FAIZA MALDONADO     PROCEDURE DATE:  06/30/2023          INTERPRETATION:  CLINICAL INFORMATION: 24 week pregnant with abdominal   pain and purulent discharge from the abdomen.    COMPARISON: MRI pelvis from 6/19/2023.    CONTRAST/COMPLICATIONS:  IV Contrast: None.  Oral Contrast: None.  Complications: None reported.    PROCEDURE:  MRI of the abdomen and pelvis was performed.  MRCP.    FINDINGS:  LOWER CHEST: Within normal limits.    LIVER: A 3 mm right hepatic cyst.  BILE DUCTS: Normal caliber.  GALLBLADDER: Within normal limits.  SPLEEN: Within normal limits.  PANCREAS: Within normal limits.  ADRENALS: Within normal limits.  KIDNEYS/URETERS: Mild bilateral hydroureteronephrosis to the level of the   uterus is unchanged.    BLADDER: Within normal limits.  REPRODUCTIVE ORGANS: Gravid uterus with single intrauterine gestation in   cephalad position.  The left ovary measures 1.3 x 0.8 cm. The right ovary is discretely   visualized. Cyst extending from the cul-de-sac to the left pelvis   measures 7.5 x 4.1 cm, unchanged from 6/19/2023.    BOWEL: No bowel obstruction.  PERITONEUM: Trace free fluid. No loculated fluid collection.  VESSELS: Within normal limits.  RETROPERITONEUM/LYMPH NODES: No lymphadenopathy.  ABDOMINAL WALL: Focal fluid collection at the umbilicus measures 1.5 x   0.8 cm.  BONES: Within normal limits.    IMPRESSION:  Focal fluid collection measuring 1.5 x 0.8cm at the umbilicus.    No intraperitoneal fluid collection to suggest abscess.    Cyst extending from the cul-de-sac to the left adnexa measuring 7.5 cm,   unchanged. The right ovary is not discretely visualized, question of   right adnexal origin.        --- End of Report ---          ANDERSON SNOW MD; Resident Radiologist  This document has been electronically signed.  ANN MARIE DONNELLY MD; Attending Radiologist  This document has been electronically signed. Jun 30 2023 10:44AM    < end of copied text >

## 2023-07-02 NOTE — PROGRESS NOTE ADULT - SUBJECTIVE AND OBJECTIVE BOX
Patient seen and examined at bedside, no acute overnight events. No acute complaints. Patient is ambulating and tolerating regular diet. Denies CP, SOB, N/V, fevers, chills, or any other concerns.    Vital Signs Last 24 Hours  T(C): 36.6 (07-02-23 @ 01:37), Max: 37.2 (07-01-23 @ 09:49)  HR: 55 (07-02-23 @ 01:37) (55 - 77)  BP: 96/51 (07-02-23 @ 01:37) (91/54 - 108/68)  RR: 16 (07-02-23 @ 01:37) (16 - 19)  SpO2: 99% (07-02-23 @ 01:37) (98% - 100%)    I&O's Summary    01 Jul 2023 07:01  -  02 Jul 2023 03:24  --------------------------------------------------------  IN: 0 mL / OUT: 475 mL / NET: -475 mL        Physical Exam:  General: NAD  CV: RR  Lungs: breathing comfortably on RA  Abdomen: soft, gravid, non-tender  Ext: no pain or swelling    Labs:             9.4<L>  12.74<H> )-----------( 415<H>    ( 06-30 @ 06:32 )             28.5<L>               11.0<L>  16.70<H> )-----------( 479<H>    ( 06-29 @ 14:40 )             33.8<L>        MEDICATIONS  (STANDING):  ampicillin/sulbactam  IVPB      ampicillin/sulbactam  IVPB 3 Gram(s) IV Intermittent every 6 hours  chlorhexidine 2% Cloths 1 Application(s) Topical daily  folic acid 1 milliGRAM(s) Oral daily  heparin   Injectable 5000 Unit(s) SubCutaneous every 12 hours  lactated ringers Bolus 1000 milliLiter(s) IV Bolus once  lactated ringers. 1000 milliLiter(s) (100 mL/Hr) IV Continuous <Continuous>  prenatal multivitamin 1 Tablet(s) Oral daily  vancomycin  IVPB 750 milliGRAM(s) IV Intermittent every 12 hours    MEDICATIONS  (PRN):   Patient seen and examined at bedside, no acute overnight events. No acute complaints. Patient is ambulating and tolerating regular diet. Denies CP, SOB, N/V, fevers, chills, or any other concerns.    Vital Signs Last 24 Hours  T(C): 36.6 (07-02-23 @ 01:37), Max: 37.2 (07-01-23 @ 09:49)  HR: 55 (07-02-23 @ 01:37) (55 - 77)  BP: 96/51 (07-02-23 @ 01:37) (91/54 - 108/68)  RR: 16 (07-02-23 @ 01:37) (16 - 19)  SpO2: 99% (07-02-23 @ 01:37) (98% - 100%)    I&O's Summary    01 Jul 2023 07:01  -  02 Jul 2023 03:24  --------------------------------------------------------  IN: 0 mL / OUT: 475 mL / NET: -475 mL        Physical Exam:  General: NAD  CV: RR  Lungs: breathing comfortably on RA  Abdomen: soft, gravid, non-tender  Ext: no pain or swelling    Labs:             9.4<L>  12.74<H> )-----------( 415<H>    ( 06-30 @ 06:32 )             28.5<L>               11.0<L>  16.70<H> )-----------( 479<H>    ( 06-29 @ 14:40 )             33.8<L>        MEDICATIONS  (STANDING):  ampicillin/sulbactam  IVPB      ampicillin/sulbactam  IVPB 3 Gram(s) IV Intermittent every 6 hours  chlorhexidine 2% Cloths 1 Application(s) Topical daily  folic acid 1 milliGRAM(s) Oral daily  heparin   Injectable 5000 Unit(s) SubCutaneous every 12 hours  lactated ringers Bolus 1000 milliLiter(s) IV Bolus once  lactated ringers. 1000 milliLiter(s) (100 mL/Hr) IV Continuous <Continuous>  prenatal multivitamin 1 Tablet(s) Oral daily  vancomycin  IVPB 750 milliGRAM(s) IV Intermittent every 12 hours

## 2023-07-02 NOTE — PROGRESS NOTE ADULT - ASSESSMENT
18 year od female 24 weeks pregnant recently admitted for diagnostic lap and abd washout for acute abd pain found to have pyoperitoneum possibly PID vs TOA, discharged home on augmentin which she completed and reported improved returned 6/29 with purulent drainage from her umbilicus incision.        MRI - focal fluid collection umbilicus 1.5 x 0.8 cm.    cyst 7.5 x 4.1     no abscess       #Purulent drainage from surgical site  - blood cultures no growth   - wound culture enterococcus avium (same organism isolated from OR 6/18)  - improving on vanco, unasyn  - organism is sensitive amp- no need for vanco     - significance of pelvic cyst unclear     # leucocytosis resolved       Suggest;    -Continue unasyn 3 grams IV q 6 hours  - when ready to d/c home can transition to augmentin x 10 days

## 2023-07-02 NOTE — PROGRESS NOTE ADULT - TIME BILLING
Late entry  Patient seen and examined at 2:00pm    Carmen is comfortable and denies any pain. Afebrile. Fetal status is reassuring.   Wound at umbilicus still with purulent drainage, approximately 7cc expressed on my exam. No surrounding erythema, induration, calor.    Culture Results:   Few Enterococcus avium  Susceptibility to follow. (06-29-23 @ 14:00)    Vaginal trichomonas negative    Will continue inpatient observation with IV vancomycin and Unasyn.  Await final wound and blood culture results.  Appreciate ID recommendations.  Once cultures result, can tailor antibiotic therapy and plan dispo.     Yoly BRYANT
MFM  I saw and evaluated Ms. Torres and agree with edited Fellow and Resident assessment and plan as above.   Awaiting wound culture (enterococcus avium) sensitivities to tailor antibiotic therapy and decide on home PO antibiotic regimen.   She it otherwise afebrile, without pain, and clinically has improved.   Etiology of pyoperitoneum is still unclear.   Fetal status is reassuring and there is no evidence of  labor.   Continue inpatient observation with close maternal and fetal surveillance.   Appreciate ID antibiotic recommendations.     Anais Coburn MD

## 2023-07-02 NOTE — PROGRESS NOTE ADULT - ASSESSMENT
19yo F  @25w+2d POD#14 s/p Diagnostic laparoscopy and abdominal washout for acute abdominal pain, found to have pyoperitoneum.  Patient was discharged on POD#4 and has been doing well at home. Presented with purulent drainage from umbilical incision. Superficial fluid collection w/ visualized on sono underneath umbilical incision.  Abdominal exam overall benign.  Patient well appearing and afebrile with normal vital signs. Fetal status reassuring on NST. Patient with irregular  contractions. Admitted to antepartum for further management, IV abx, IR consult. Stable overnight.     #r/o PTL  - VE  closed/long  - CL 2.5-3cm  - ctx improved s/p IV fluid bolus   - continue to monitor for s/s PTL    #purulent drainage from surgical site  - VSS, patient afebrile   - C/w Unasyn(-), Vanc (-)  - Surgical site cultures collected and sent   - Mild leukocytosis downtrending , cont to trend, fever curve.  - MR abdomen/pelvis: Small amount of free fluid in the pelvis, no evidence of abscess  - Appreciate IR consult: Collection too small for percutaneous drainage  - Appreciate ID consult = Unasyn and Vanc for IV abx    Danielle Mccoy, PGY3   19yo F  @25w+2d POD#14 s/p Diagnostic laparoscopy and abdominal washout for acute abdominal pain, found to have pyoperitoneum.  Patient was discharged on POD#4 and has been doing well at home. Presented with purulent drainage from umbilical incision. Superficial fluid collection w/ visualized on sono underneath umbilical incision.  Abdominal exam overall benign.  Patient well appearing and afebrile with normal vital signs. Fetal status reassuring on NST. Patient with irregular  contractions. Admitted to antepartum for further management, IV abx, IR consult. Stable overnight.     #r/o PTL  - VE  closed/long  - CL 2.5-3cm  - ctx improved s/p IV fluid bolus   - continue to monitor for s/s PTL    #purulent drainage from surgical site  - VSS, patient afebrile   - C/w Unasyn(-), Vanc (-)  - Surgical site cultures collected and sent   - Mild leukocytosis downtrending , cont to trend, fever curve.  - MR abdomen/pelvis: Small amount of free fluid in the pelvis, no evidence of abscess  - Appreciate IR consult: Collection too small for percutaneous drainage  - Appreciate ID consult = Unasyn and Vanc for IV abx    Danielle Mccoy, PGY3      MFM Fellow Addendum    19yo F  @25w+2d POD#14 s/p Diagnostic laparoscopy and abdominal washout for acute abdominal pain, found to have pyoperitoneum.  - Presented with purulent drainage from umbilical incision on POD#11. VSS. Asxs. Mild purlent discharge from umbilical port site improving. On IV unasyn, will d/c vanc per ID. To continue IV abx until culture sensitivies return. EFM reassuring. Will cnt to monitor closely.      Patient seen with Dr. Coburn ([facility] attending)    Osmany Nguyen M.D. FACOG PGY-7  Maternal Fetal Medicine Fellow  Cell: [phone] if after 5pm or weekend ask labor and delivery for on call fellow     19yo F  @25w+2d POD#14 s/p Diagnostic laparoscopy and abdominal washout for acute abdominal pain, found to have pyoperitoneum.  Patient was discharged on POD#4 and has been doing well at home. Presented with purulent drainage from umbilical incision. Superficial fluid collection w/ visualized on sono underneath umbilical incision.  Abdominal exam overall benign.  Patient well appearing and afebrile with normal vital signs. Fetal status reassuring on NST. Patient with irregular  contractions. Admitted to antepartum for further management, IV abx, IR consult. Stable overnight.     #r/o PTL  - VE  closed/long  - CL 2.5-3cm  - ctx improved s/p IV fluid bolus   - continue to monitor for s/s PTL    #purulent drainage from surgical site  - VSS, patient afebrile   - C/w Unasyn(-), Vanc (-)  - Surgical site cultures collected and sent  - pending sensitivities   - Mild leukocytosis downtrending , cont to trend, fever curve.  - MR abdomen/pelvis: Small amount of free fluid in the pelvis, no evidence of abscess  - Appreciate IR consult: Collection too small for percutaneous drainage  - Appreciate ID consult = Unasyn and Vanc for IV abx    Danielle Mccoy, PGY3      MFM Fellow Addendum    19yo F  @25w+2d POD#14 s/p Diagnostic laparoscopy and abdominal washout for acute abdominal pain, found to have pyoperitoneum.  - Presented with purulent drainage from umbilical incision on POD#11. VSS. Asxs. Mild purulent discharge from umbilical port site improving. On IV unasyn, will d/c vanc per ID. To continue IV abx until culture sensitivities return. EFM reassuring. Will cnt to monitor closely.      Patient seen with Dr. Coburn ([facility] attending)    Osmany Nguyen M.D. FACOG PGY-7  Maternal Fetal Medicine Fellow  Cell: [phone] if after 5pm or weekend ask labor and delivery for on call fellow

## 2023-07-03 LAB
RUBV IGG SER-ACNC: 4.8 INDEX — SIGNIFICANT CHANGE UP
RUBV IGG SER-IMP: POSITIVE — SIGNIFICANT CHANGE UP

## 2023-10-02 ENCOUNTER — APPOINTMENT (OUTPATIENT)
Dept: ANTEPARTUM | Facility: CLINIC | Age: 18
End: 2023-10-02
Payer: MEDICAID

## 2023-10-02 ENCOUNTER — ASOB RESULT (OUTPATIENT)
Age: 18
End: 2023-10-02

## 2023-10-02 PROCEDURE — 76818 FETAL BIOPHYS PROFILE W/NST: CPT | Mod: 59

## 2023-10-02 PROCEDURE — 76820 UMBILICAL ARTERY ECHO: CPT | Mod: 59

## 2023-10-02 PROCEDURE — 99214 OFFICE O/P EST MOD 30 MIN: CPT | Mod: 25

## 2023-10-02 PROCEDURE — 76816 OB US FOLLOW-UP PER FETUS: CPT

## 2023-10-03 ENCOUNTER — INPATIENT (INPATIENT)
Facility: HOSPITAL | Age: 18
LOS: 1 days | Discharge: HOME | End: 2023-10-05
Attending: OBSTETRICS & GYNECOLOGY | Admitting: OBSTETRICS & GYNECOLOGY

## 2023-10-03 VITALS
WEIGHT: 85.1 LBS | TEMPERATURE: 98 F | HEIGHT: 63 IN | RESPIRATION RATE: 18 BRPM | HEART RATE: 68 BPM | SYSTOLIC BLOOD PRESSURE: 110 MMHG | DIASTOLIC BLOOD PRESSURE: 59 MMHG

## 2023-10-03 DIAGNOSIS — O41.00X0 OLIGOHYDRAMNIOS, UNSPECIFIED TRIMESTER, NOT APPLICABLE OR UNSPECIFIED: ICD-10-CM

## 2023-10-03 DIAGNOSIS — Z98.890 OTHER SPECIFIED POSTPROCEDURAL STATES: Chronic | ICD-10-CM

## 2023-10-03 LAB
ANISOCYTOSIS BLD QL: SLIGHT — SIGNIFICANT CHANGE UP
BASOPHILS # BLD AUTO: 0 K/UL — SIGNIFICANT CHANGE UP (ref 0–0.2)
BASOPHILS NFR BLD AUTO: 0 % — SIGNIFICANT CHANGE UP (ref 0–2)
BLD GP AB SCN SERPL QL: NEGATIVE — SIGNIFICANT CHANGE UP
BURR CELLS BLD QL SMEAR: PRESENT — SIGNIFICANT CHANGE UP
DACRYOCYTES BLD QL SMEAR: SLIGHT — SIGNIFICANT CHANGE UP
EOSINOPHIL # BLD AUTO: 0 K/UL — SIGNIFICANT CHANGE UP (ref 0–0.5)
EOSINOPHIL NFR BLD AUTO: 0 % — SIGNIFICANT CHANGE UP (ref 0–6)
GIANT PLATELETS BLD QL SMEAR: PRESENT — SIGNIFICANT CHANGE UP
HCT VFR BLD CALC: 31.2 % — LOW (ref 34.5–45)
HGB BLD-MCNC: 9.6 G/DL — LOW (ref 11.5–15.5)
IANC: 7.19 K/UL — SIGNIFICANT CHANGE UP (ref 1.8–7.4)
LYMPHOCYTES # BLD AUTO: 1.47 K/UL — SIGNIFICANT CHANGE UP (ref 1–3.3)
LYMPHOCYTES # BLD AUTO: 14.3 % — SIGNIFICANT CHANGE UP (ref 13–44)
MANUAL SMEAR VERIFICATION: SIGNIFICANT CHANGE UP
MCHC RBC-ENTMCNC: 21.6 PG — LOW (ref 27–34)
MCHC RBC-ENTMCNC: 30.8 GM/DL — LOW (ref 32–36)
MCV RBC AUTO: 70.3 FL — LOW (ref 80–100)
MICROCYTES BLD QL: SLIGHT — SIGNIFICANT CHANGE UP
MONOCYTES # BLD AUTO: 0.28 K/UL — SIGNIFICANT CHANGE UP (ref 0–0.9)
MONOCYTES NFR BLD AUTO: 2.7 % — SIGNIFICANT CHANGE UP (ref 2–14)
NEUTROPHILS # BLD AUTO: 8.42 K/UL — HIGH (ref 1.8–7.4)
NEUTROPHILS NFR BLD AUTO: 82.1 % — HIGH (ref 43–77)
OVALOCYTES BLD QL SMEAR: SLIGHT — SIGNIFICANT CHANGE UP
PLAT MORPH BLD: ABNORMAL
PLATELET # BLD AUTO: 241 K/UL — SIGNIFICANT CHANGE UP (ref 150–400)
PLATELET COUNT - ESTIMATE: NORMAL — SIGNIFICANT CHANGE UP
POIKILOCYTOSIS BLD QL AUTO: SLIGHT — SIGNIFICANT CHANGE UP
POLYCHROMASIA BLD QL SMEAR: SLIGHT — SIGNIFICANT CHANGE UP
RBC # BLD: 4.44 M/UL — SIGNIFICANT CHANGE UP (ref 3.8–5.2)
RBC # FLD: 16 % — HIGH (ref 10.3–14.5)
RBC BLD AUTO: ABNORMAL
RH IG SCN BLD-IMP: NEGATIVE — SIGNIFICANT CHANGE UP
T PALLIDUM AB TITR SER: NEGATIVE — SIGNIFICANT CHANGE UP
VARIANT LYMPHS # BLD: 0.9 % — SIGNIFICANT CHANGE UP (ref 0–6)
WBC # BLD: 10.25 K/UL — SIGNIFICANT CHANGE UP (ref 3.8–10.5)
WBC # FLD AUTO: 10.25 K/UL — SIGNIFICANT CHANGE UP (ref 3.8–10.5)

## 2023-10-03 RX ORDER — HYDROCORTISONE 1 %
1 OINTMENT (GRAM) TOPICAL EVERY 6 HOURS
Refills: 0 | Status: DISCONTINUED | OUTPATIENT
Start: 2023-10-03 | End: 2023-10-05

## 2023-10-03 RX ORDER — IBUPROFEN 200 MG
600 TABLET ORAL EVERY 6 HOURS
Refills: 0 | Status: COMPLETED | OUTPATIENT
Start: 2023-10-03 | End: 2024-08-31

## 2023-10-03 RX ORDER — LANOLIN
1 OINTMENT (GRAM) TOPICAL EVERY 6 HOURS
Refills: 0 | Status: DISCONTINUED | OUTPATIENT
Start: 2023-10-03 | End: 2023-10-05

## 2023-10-03 RX ORDER — OXYTOCIN 10 UNIT/ML
333.33 VIAL (ML) INJECTION
Qty: 20 | Refills: 0 | Status: DISCONTINUED | OUTPATIENT
Start: 2023-10-03 | End: 2023-10-03

## 2023-10-03 RX ORDER — SODIUM CHLORIDE 9 MG/ML
1000 INJECTION, SOLUTION INTRAVENOUS
Refills: 0 | Status: DISCONTINUED | OUTPATIENT
Start: 2023-10-03 | End: 2023-10-03

## 2023-10-03 RX ORDER — INFLUENZA VIRUS VACCINE 15; 15; 15; 15 UG/.5ML; UG/.5ML; UG/.5ML; UG/.5ML
0.5 SUSPENSION INTRAMUSCULAR ONCE
Refills: 0 | Status: DISCONTINUED | OUTPATIENT
Start: 2023-10-03 | End: 2023-10-05

## 2023-10-03 RX ORDER — SODIUM CHLORIDE 9 MG/ML
3 INJECTION INTRAMUSCULAR; INTRAVENOUS; SUBCUTANEOUS EVERY 8 HOURS
Refills: 0 | Status: DISCONTINUED | OUTPATIENT
Start: 2023-10-03 | End: 2023-10-05

## 2023-10-03 RX ORDER — PRAMOXINE HYDROCHLORIDE 150 MG/15G
1 AEROSOL, FOAM RECTAL EVERY 4 HOURS
Refills: 0 | Status: DISCONTINUED | OUTPATIENT
Start: 2023-10-03 | End: 2023-10-05

## 2023-10-03 RX ORDER — AER TRAVELER 0.5 G/1
1 SOLUTION RECTAL; TOPICAL EVERY 4 HOURS
Refills: 0 | Status: DISCONTINUED | OUTPATIENT
Start: 2023-10-03 | End: 2023-10-05

## 2023-10-03 RX ORDER — BENZOCAINE 10 %
1 GEL (GRAM) MUCOUS MEMBRANE EVERY 6 HOURS
Refills: 0 | Status: DISCONTINUED | OUTPATIENT
Start: 2023-10-03 | End: 2023-10-05

## 2023-10-03 RX ORDER — CITRIC ACID/SODIUM CITRATE 300-500 MG
15 SOLUTION, ORAL ORAL EVERY 6 HOURS
Refills: 0 | Status: DISCONTINUED | OUTPATIENT
Start: 2023-10-03 | End: 2023-10-03

## 2023-10-03 RX ORDER — MAGNESIUM HYDROXIDE 400 MG/1
30 TABLET, CHEWABLE ORAL
Refills: 0 | Status: DISCONTINUED | OUTPATIENT
Start: 2023-10-03 | End: 2023-10-05

## 2023-10-03 RX ORDER — OXYTOCIN 10 UNIT/ML
VIAL (ML) INJECTION
Qty: 30 | Refills: 0 | Status: DISCONTINUED | OUTPATIENT
Start: 2023-10-03 | End: 2023-10-03

## 2023-10-03 RX ORDER — DIBUCAINE 1 %
1 OINTMENT (GRAM) RECTAL EVERY 6 HOURS
Refills: 0 | Status: DISCONTINUED | OUTPATIENT
Start: 2023-10-03 | End: 2023-10-05

## 2023-10-03 RX ORDER — IBUPROFEN 200 MG
600 TABLET ORAL EVERY 6 HOURS
Refills: 0 | Status: DISCONTINUED | OUTPATIENT
Start: 2023-10-03 | End: 2023-10-05

## 2023-10-03 RX ORDER — CHLORHEXIDINE GLUCONATE 213 G/1000ML
1 SOLUTION TOPICAL DAILY
Refills: 0 | Status: DISCONTINUED | OUTPATIENT
Start: 2023-10-03 | End: 2023-10-03

## 2023-10-03 RX ORDER — ACETAMINOPHEN 500 MG
975 TABLET ORAL
Refills: 0 | Status: DISCONTINUED | OUTPATIENT
Start: 2023-10-03 | End: 2023-10-05

## 2023-10-03 RX ORDER — OXYTOCIN 10 UNIT/ML
41.67 VIAL (ML) INJECTION
Qty: 20 | Refills: 0 | Status: DISCONTINUED | OUTPATIENT
Start: 2023-10-03 | End: 2023-10-05

## 2023-10-03 RX ORDER — DIPHENHYDRAMINE HCL 50 MG
25 CAPSULE ORAL EVERY 6 HOURS
Refills: 0 | Status: DISCONTINUED | OUTPATIENT
Start: 2023-10-03 | End: 2023-10-05

## 2023-10-03 RX ORDER — KETOROLAC TROMETHAMINE 30 MG/ML
30 SYRINGE (ML) INJECTION ONCE
Refills: 0 | Status: DISCONTINUED | OUTPATIENT
Start: 2023-10-03 | End: 2023-10-03

## 2023-10-03 RX ORDER — TETANUS TOXOID, REDUCED DIPHTHERIA TOXOID AND ACELLULAR PERTUSSIS VACCINE, ADSORBED 5; 2.5; 8; 8; 2.5 [IU]/.5ML; [IU]/.5ML; UG/.5ML; UG/.5ML; UG/.5ML
0.5 SUSPENSION INTRAMUSCULAR ONCE
Refills: 0 | Status: DISCONTINUED | OUTPATIENT
Start: 2023-10-03 | End: 2023-10-05

## 2023-10-03 RX ORDER — SIMETHICONE 80 MG/1
80 TABLET, CHEWABLE ORAL EVERY 4 HOURS
Refills: 0 | Status: DISCONTINUED | OUTPATIENT
Start: 2023-10-03 | End: 2023-10-05

## 2023-10-03 RX ADMIN — SODIUM CHLORIDE 3 MILLILITER(S): 9 INJECTION INTRAMUSCULAR; INTRAVENOUS; SUBCUTANEOUS at 21:59

## 2023-10-03 RX ADMIN — Medication 600 MILLIGRAM(S): at 21:21

## 2023-10-03 RX ADMIN — Medication 30 MILLIGRAM(S): at 13:27

## 2023-10-03 RX ADMIN — Medication 975 MILLIGRAM(S): at 17:00

## 2023-10-03 RX ADMIN — Medication 975 MILLIGRAM(S): at 23:59

## 2023-10-03 RX ADMIN — Medication 600 MILLIGRAM(S): at 20:51

## 2023-10-03 NOTE — OB PROVIDER H&P - ASSESSMENT
A&P:   Labor: admit to L&D  -Buccal cytotec  -routine labs  -EFM/toco  -NPO, IV hydration  Fetal: cat 1 tracing, fetal status reassuring  GBS: neg  Analgesia: epidural PRN        Discussed with Dr. Naveen Castillo PGY-1 A&P:   Labor: admit to L&D  -Buccal cytotec  -routine labs  -EFM/toco  -NPO, IV hydration  Fetal: cat 1 tracing, fetal status reassuring  GBS: neg  Analgesia: epidural PRN        Discussed with Dr. Naveen Castillo PGY-1    19y/o  at 38 3/7 weeks EGA admitted for IOL for Oligohydramnios and IUGR.  Samir Hodge M.D. A&P: Pt is a 17y/o  at 38w3d admitted for IOL for Oligo (SOMMER: 2.4cm) and IUGR (EFW<1%, AC<1%)  Labor: admit to L&D  -Buccal cytotec  -routine labs  -EFM/toco  -NPO, IV hydration  Fetal: cat 1 tracing, fetal status reassuring  GBS: neg  Analgesia: epidural PRN        Discussed with Dr. Naveen Castillo PGY-1    17y/o  at 38 3/7 weeks EGA admitted for IOL for Oligohydramnios and IUGR.  Samir Hodge M.D.

## 2023-10-03 NOTE — OB PROVIDER LABOR PROGRESS NOTE - ASSESSMENT
17y/o  at 38+4wks oligo & IUGR IOL fully dilated    Plan  -start pushing  -anticipate     Dr Mcgill PGY4 & Dr Zhang updated  Diana Sykes CNM
17yo @38+4 IOL for oligo and IUGR    - SVE: /-1  - FHT Cat 1, reassuring, ctm  - for buccal cytotec when able  - Will bring down to L&D and request anesthesia consult for epidural    d/w Dr. Naveen Morgan, PGY3

## 2023-10-03 NOTE — OB RN DELIVERY SUMMARY - NS_CORDBLDTYPEA_OBGYN_ALL_OB
Yes Zoryve Counseling:  I discussed with the patient that Zoryve is not for use in the eyes, mouth or vagina. The most commonly reported side effects include diarrhea, headache, insomnia, application site pain, upper respiratory tract infections, and urinary tract infections.  All of the patient's questions and concerns were addressed.

## 2023-10-03 NOTE — OB RN DELIVERY SUMMARY - NS_SEPSISRSKCALC_OBGYN_ALL_OB_FT
EOS calculated successfully. EOS Risk Factor: 0.5/1000 live births ([facility] national incidence); GA=38w4d; Temp=98.06; ROM=0.217; GBS='Negative'; Antibiotics='No antibiotics or any antibiotics < 2 hrs prior to birth'

## 2023-10-03 NOTE — OB PROVIDER DELIVERY SUMMARY - NSSELHIDDEN_OBGYN_ALL_OB_FT
[NS_DeliveryAttending1_OBGYN_ALL_OB_FT:[id number]] [NS_DeliveryAttending1_OBGYN_ALL_OB_FT:[id number]],[NS_DeliveryAssist1_OBGYN_ALL_OB_FT:[id number]=]

## 2023-10-03 NOTE — OB RN PATIENT PROFILE - TOBACCO USE
Physical Therapy Visit    Referred by: Job Pedersen MD; Medical Diagnosis (from order):    Diagnosis Information      Diagnosis    736.71 (ICD-9-CM) - M21.542 (ICD-10-CM) - Acquired equinovarus deformity of left foot    V45.89 (ICD-9-CM) - Z98.890 (ICD-10-CM) - Status post left foot surgery              Visit: 15    Visit Type: Daily Treatment Note    SUBJECTIVE                                                                                                             Present and reporting subjective information: father  Alex is walking around the house without walker but will use in morning and at night when tired. Getting on and off school bus and using walker at school fine. 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    OBJECTIVE                                                                                                                             TREATMENT                                                                                                                  Therapeutic Exercise:  Bridges double leg x5, right leg on peanut ball x10  Tall kneel to color on mirror    Therapeutic Activity:  Sit to stands no AD staggered left back x5  Obstacle course stepping over various objects (bolster, stepping stone, stepping into circles reciprocally),  and navigating 4 steps cueing for left step to up working toward alternating with 2 HR, does step to left lead down x8  Static stand to color at mirror with right foot on dynadisc  Attempted to get on floor - refused      Skilled input: verbal instruction/cues, tactile instruction/cues and as detailed above    Home Exercise Program: Access Code: [email or web address]  URL: [email or web address]  Date: 01/06/2023  Prepared by: Summer White    Exercises  ? Seated Knee Extension Stretch with Chair - 5 reps - 60 seconds hold  ? Staggered Sit-to-Stand - 2 sets - 5-10 reps  ? Tall Kneeling  ? Staggered Bridge - 2 sets  - 10 reps            ASSESSMENT                                                                                                               Alex went without AD throughout session and was motivated to try obstacle course and making progress on step. Has large Trendelenburg with gait so focused a bit more on left hip but may also be due to pre-existing hip issues? (dad mentioned at end of session)      PLAN                                                                                                                           Suggestions for next session as indicated: Progress per plan of care  Look back into ortho notes re: hip           Therapy procedure time and total treatment time can be found documented on the Time Entry flowsheet   Never smoker

## 2023-10-03 NOTE — OB PROVIDER LABOR PROGRESS NOTE - NS_SUBJECTIVE/OBJECTIVE_OBGYN_ALL_OB_FT
Pt seen & examined at bedside for labor progress 2/2 rectal pressure. Resting comfortably with epidural.     Vital Signs Last 24 Hrs  T(C): 36.5 (03 Oct 2023 08:00), Max: 36.7 (03 Oct 2023 05:00)  T(F): 97.7 (03 Oct 2023 08:00), Max: 98.06 (03 Oct 2023 05:00)  HR: 85 (03 Oct 2023 08:47) (51 - 247)  BP: 102/70 (03 Oct 2023 08:47) (82/44 - 146/78)  BP(mean): --  RR: 17 (03 Oct 2023 08:00) (14 - 18)  SpO2: 100% (03 Oct 2023 06:36) (84% - 100%)    Parameters below as of 03 Oct 2023 01:11  Patient On (Oxygen Delivery Method): room air         , moderate variability, +accels, -decels, cat 1   TOCO: Q5-6min  VE: 10/100/+1
Patient reports severe rectal pressure and is requesting an epidural.    T(C): 36.5 (10-03-23 @ 01:21), Max: 36.5 (10-03-23 @ 01:11)  HR: 68 (10-03-23 @ 01:21) (68 - 68)  BP: 110/59 (10-03-23 @ 01:21) (110/59 - 110/59)  RR: 17 (10-03-23 @ 01:21) (17 - 18)  SpO2: 99% (10-03-23 @ 01:21) (99% - 99%)

## 2023-10-03 NOTE — OB RN DELIVERY SUMMARY - NSSELHIDDEN_OBGYN_ALL_OB_FT
[NS_DeliveryAttending1_OBGYN_ALL_OB_FT:[id number]],[NS_DeliveryAssist1_OBGYN_ALL_OB_FT:[id number]],[NS_DeliveryRN_OBGYN_ALL_OB_FT:[id number]==]

## 2023-10-03 NOTE — OB PROVIDER H&P - HISTORY OF PRESENT ILLNESS
R1 H&P    Pt is a 19y/o  at 38w3d admitted for IOL for Oligo (SOMMER: 2.4cm) and IUGR.   Prenatal course: 2023 diagnostic lap and washout dx pyoperitoneum. Patient then returned after discharge for purulent drainage from umbillicus   GBS: neg   EFW: 5lb1oz (2296g)    OBHx: denies  GynHx: L ovarian cyst 7.5cm, denies h/o abnormal paps, STI's, fibroids  PMHx: denies  PSHx: 2023 diagnostic lap and washout dx pyoperitoneum.   Med: none   All: NKDA  SH: denies alcohol, tobacco, or drug use  Psych: denies h/o anxiety or depression     R1 H&P    Pt is a 17y/o  at 38w3d admitted for IOL for Oligo (SOMMER: 2.4cm) and IUGR (EFW<1%, AC<1%)  Prenatal course: 2023 diagnostic lap and washout dx pyoperitoneum. Patient then returned after discharge for purulent drainage from umbillicus   GBS: neg   EFW: 5lb1oz (2296g)    OBHx: denies  GynHx: L ovarian cyst 7.5cm, denies h/o abnormal paps, STI's, fibroids  PMHx: denies  PSHx: 2023 diagnostic lap and washout dx pyoperitoneum.   Med: none   All: NKDA  SH: denies alcohol, tobacco, or drug use  Psych: denies h/o anxiety or depression     R1 H&P    Pt is a 19y/o  at 38w3d admitted for IOL for Oligo (SOMMER: 2.4cm) and IUGR (EFW<1%, AC<1%)  Prenatal course: 2023 diagnostic lap and washout dx pyoperitoneum. Patient then returned after discharge for purulent drainage from umbillicus   GBS: neg   EFW: 5lb1oz (2296g)  ATU sono 10/2/2023: vertex, anterior placenta, SOMMER: 2.43, EFW:2296g((<1%, AC<1%)     OBHx: denies  GynHx: L ovarian cyst 7.5cm, denies h/o abnormal paps, STI's, fibroids  PMHx: denies  PSHx: 2023 diagnostic lap and washout dx pyoperitoneum.   Med: none   All: NKDA  SH: denies alcohol, tobacco, or drug use  Psych: denies h/o anxiety or depression

## 2023-10-03 NOTE — OB NEONATOLOGY/PEDIATRICIAN DELIVERY SUMMARY - NSPEDSNEONOTESA_OBGYN_ALL_OB_FT
Peds called to LDR for cat II tracing and VAVD. 38.4 wk SGA male born via vacuum assisted VD to a 19 y/o  mother.  Prenatal history of oligo and IUGR. No significant maternal history. Maternal labs include Blood Type O-, HIV - , RPR NR , Rubella I , Hep B - , GBS unknown. SROM at 0825 on 10/3 with clear fluids (ROM hours: 0H13M).  Baby emerged vigorous, crying, was w/d/s/s with APGARS of 8/9. Mom plans to initiate breastfeeding and not sure if desires Hep B vaccine.  Highest maternal temp: 36.7. EOS 0.04.    : 10/3/23  TOB: 0838  Weight: 2330g    Physical Exam:  Gen: no acute distress, +grimace  HEENT:  anterior fontanel open soft and flat, nondysmorphic facies, no cleft lip/palate, ears normal set  Resp: Normal respiratory effort without grunting or retractions  Cardio: regular rate and rhythm,  Abd: soft, non tender, non distended, umbilical cord with 3 vessels  Neuro: +palmar and plantar grasp, +suck, +juan carlos, normal tone  Extremities: negative zacarias and ortolani maneuvers, moving all extremities, no clavicular crepitus or stepoff  Skin: pink, warm  Genitals: Normal male anatomy, testicles palpable in scrotum b/l, Moises 1, anus patent

## 2023-10-03 NOTE — OB PROVIDER DELIVERY SUMMARY - NSPROVIDERDELIVERYNOTE_OBGYN_ALL_OB_FT
Pt found to be fully dilated with urge to push. Following one push, FHR with terminal bradycardia to 60s. Following approximately 4 minutes of deceleration, Kiwi vacuum applied. LML performed. Following two pulls, successful delivery of viable male infant. Head, shoulders and body delivered easily in MARLENE position. Placenta delivered intact. Vaginal exam revealed intact cervix, vaginal walls, sulci. LML repaired in standard fashion with chromic suture. Left labial laceration also repaired with chromic suture in standard fashion. Bimanual exam performed, with minimal clot evacuated. Fundus and lower uterine segment firm. Excellent hemostasis.    Lena Main PGY-4  w/ Dr. Zhang

## 2023-10-03 NOTE — OB RN PATIENT PROFILE - FALL HARM RISK - UNIVERSAL INTERVENTIONS
Bed in lowest position, wheels locked, appropriate side rails in place/Call bell, personal items and telephone in reach/Instruct patient to call for assistance before getting out of bed or chair/Non-slip footwear when patient is out of bed/[place] to call system/Purposeful Proactive Rounding/Room/bathroom lighting operational, light cord in reach

## 2023-10-03 NOTE — OB PROVIDER DELIVERY SUMMARY - NSOBVTERISKREFER_OBGYN_ALL_OB
Lin Worley MD  [facility] [employer]  [address], [address] [place] [address], [address] [facility]  [phone]  Phone  [phone]  Fax    Cher Winkler,    We noticed that you missed your last appointment. We are interested in your progress and how you have been feeling! As you know, it is important to complete regular follow-up visits to monitor your health after surgery and to insure the best success of your procedure. Please call the office today at [phone] to schedule an appointment. We look forward to seeing you!       Lin Worley MD  [facility] [employer] [facility]  [address] [facility], [address] [place] [address], [address] [facility]  [phone]  Phone  [phone]  Fax Refer to the Assessment tab to view/cancel completed assessment.

## 2023-10-04 ENCOUNTER — TRANSCRIPTION ENCOUNTER (OUTPATIENT)
Age: 18
End: 2023-10-04

## 2023-10-04 DIAGNOSIS — Z37.9 OUTCOME OF DELIVERY, UNSPECIFIED: ICD-10-CM

## 2023-10-04 LAB
BASOPHILS # BLD AUTO: 0 K/UL — SIGNIFICANT CHANGE UP (ref 0–0.2)
BASOPHILS NFR BLD AUTO: 0 % — SIGNIFICANT CHANGE UP (ref 0–2)
EOSINOPHIL # BLD AUTO: 0 K/UL — SIGNIFICANT CHANGE UP (ref 0–0.5)
EOSINOPHIL NFR BLD AUTO: 0 % — SIGNIFICANT CHANGE UP (ref 0–6)
HCT VFR BLD CALC: 27.2 % — LOW (ref 34.5–45)
HGB BLD-MCNC: 8.5 G/DL — LOW (ref 11.5–15.5)
IANC: 14.6 K/UL — HIGH (ref 1.8–7.4)
KLEIHAUER-BETKE CALCULATION: 0 % — SIGNIFICANT CHANGE UP
LYMPHOCYTES # BLD AUTO: 13.9 % — SIGNIFICANT CHANGE UP (ref 13–44)
LYMPHOCYTES # BLD AUTO: 2.61 K/UL — SIGNIFICANT CHANGE UP (ref 1–3.3)
MCHC RBC-ENTMCNC: 22 PG — LOW (ref 27–34)
MCHC RBC-ENTMCNC: 31.3 GM/DL — LOW (ref 32–36)
MCV RBC AUTO: 70.5 FL — LOW (ref 80–100)
MONOCYTES # BLD AUTO: 0.81 K/UL — SIGNIFICANT CHANGE UP (ref 0–0.9)
MONOCYTES NFR BLD AUTO: 4.3 % — SIGNIFICANT CHANGE UP (ref 2–14)
NEUTROPHILS # BLD AUTO: 15.22 K/UL — HIGH (ref 1.8–7.4)
NEUTROPHILS NFR BLD AUTO: 75.7 % — SIGNIFICANT CHANGE UP (ref 43–77)
PLATELET # BLD AUTO: 220 K/UL — SIGNIFICANT CHANGE UP (ref 150–400)
RBC # BLD: 3.86 M/UL — SIGNIFICANT CHANGE UP (ref 3.8–5.2)
RBC # FLD: 15.8 % — HIGH (ref 10.3–14.5)
WBC # BLD: 18.81 K/UL — HIGH (ref 3.8–10.5)
WBC # FLD AUTO: 18.81 K/UL — HIGH (ref 3.8–10.5)

## 2023-10-04 RX ADMIN — Medication 975 MILLIGRAM(S): at 15:45

## 2023-10-04 RX ADMIN — Medication 600 MILLIGRAM(S): at 03:51

## 2023-10-04 RX ADMIN — Medication 975 MILLIGRAM(S): at 20:10

## 2023-10-04 RX ADMIN — Medication 600 MILLIGRAM(S): at 03:21

## 2023-10-04 RX ADMIN — SODIUM CHLORIDE 3 MILLILITER(S): 9 INJECTION INTRAMUSCULAR; INTRAVENOUS; SUBCUTANEOUS at 06:00

## 2023-10-04 RX ADMIN — Medication 975 MILLIGRAM(S): at 15:01

## 2023-10-04 RX ADMIN — Medication 975 MILLIGRAM(S): at 20:40

## 2023-10-04 RX ADMIN — SODIUM CHLORIDE 3 MILLILITER(S): 9 INJECTION INTRAMUSCULAR; INTRAVENOUS; SUBCUTANEOUS at 21:37

## 2023-10-04 RX ADMIN — Medication 975 MILLIGRAM(S): at 00:29

## 2023-10-04 NOTE — DISCHARGE NOTE OB - CARE PLAN
Principal Discharge DX:	Vacuum-assisted vaginal delivery  Assessment and plan of treatment:	Make your follow-up appointment with your doctor in 6 weeks for a post-partum check. No heavy lifting, driving, or strenuous activity for 6 weeks. Nothing per vagina such as tampons, intercourse, douches, or tub baths for 6 weeks or until you see your doctor. Call your doctor with any signs and symptoms of infection such as fever, chills, nausea, or vomiting. Call your doctor if you're unable to tolerate food, increase in vaginal bleeding, or have difficulty urinating. Call your doctor if you have pain that is not relieved by your prescribed medications. Notify your doctor with any other concerns.   1

## 2023-10-04 NOTE — DISCHARGE NOTE OB - NS MD DC FALL RISK RISK
For information on Fall & Injury Prevention, visit: [email or web address]/[email or web address]-[email or web address]-health-and-mobility OR  [email or web address]://www.[facility]/news/fall-prevention-tips-to-avoid-injury OR  https://www.cdc.gov/steadi/patient.html For information on Fall & Injury Prevention, visit: https://www.[facility]/news/fall-prevention-protects-and-maintains-health-and-mobility OR  https://www.[facility]/news/fall-prevention-tips-to-avoid-injury OR  https://www.cdc.gov/steadi/patient.html For information on Fall & Injury Prevention, visit: https://www.[facility]/news/fall-prevention-protects-and-maintains-health-and-mobility OR  https://www.[facility]/news/fall-prevention-tips-to-avoid-injury OR  https://www.cdc.gov/steadi/patient.html

## 2023-10-04 NOTE — DISCHARGE NOTE OB - PROVIDER TOKENS
FREE:[LAST:[Jodi],PHONE:[(   )    -],FAX:[(   )    -],ADDRESS:[[address] [place] [address]  [phone]]] FREE:[LAST:[Jodi],PHONE:[(   )    -],FAX:[(   )    -],ADDRESS:[[address] [place] [address]  [phone]]] FREE:[LAST:[Jodi],PHONE:[(   )    -],FAX:[(   )    -],ADDRESS:[[address] [place] [address]  [phone]]]

## 2023-10-04 NOTE — DISCHARGE NOTE OB - MEDICATION SUMMARY - MEDICATIONS TO STOP TAKING
I will STOP taking the medications listed below when I get home from the hospital:    amoxicillin-clavulanate 875 mg-125 mg oral tablet  -- 1 tab(s) by mouth 2 times a day MDD: 2 pills    amoxicillin-clavulanate 875 mg-125 mg oral tablet  -- 1 tab(s) by mouth 2 times a day

## 2023-10-04 NOTE — DISCHARGE NOTE OB - MEDICATION SUMMARY - MEDICATIONS TO TAKE
I will START or STAY ON the medications listed below when I get home from the hospital:    ibuprofen 600 mg oral tablet  -- 1 tab(s) by mouth every 6 hours  -- Indication: For Vacuum-assisted vaginal delivery    acetaminophen 325 mg oral tablet  -- 2 tab(s) by mouth every 6 hours As needed Mild Pain (1 - 3)  -- Indication: For Vacuum-assisted vaginal delivery

## 2023-10-04 NOTE — DISCHARGE NOTE OB - CARE PROVIDER_API CALL
Jodi,   [address] [place], [place] [address] [phone]  Phone: (   )    -  Fax: (   )    -  Follow Up Time:    Jodi,   [address] [place], [place] [address] [phone]  Phone: (   )    -  Fax: (   )    -  Follow Up Time:    Jodi,   [address] [place], [place] [address] [phone]  Phone: (   )    -  Fax: (   )    -  Follow Up Time:

## 2023-10-04 NOTE — DISCHARGE NOTE OB - HOSPITAL COURSE
Patient was admitted to L+D for VAVD, Pt had an uncomplicated VAVD followed by an uncomplicated postpartum course.  EBL: 103  Hct: 31.2->27.2  On Postpartum day 2, patient was discharged home in stable condition, voiding spontaneously, pain well controlled, ambulating, tolerating PO and with normal vital signs. Patient declined postpartum birth control. Pt plans to follow up at [facility] with Dr. Zapata in 6 weeks. Telephone number and clinic information provided prior to discharge.  Patient was admitted to L+D for VAVD, Pt had an uncomplicated VAVD followed by an uncomplicated postpartum course.  EBL: 103  Hct: 31.2->27.2  On Postpartum day 2, patient was discharged home in stable condition, voiding spontaneously, pain well controlled, ambulating, tolerating PO and with normal vital signs. Patient declined postpartum birth control. Pt plans to follow up at [facility] with Dr. Zapata in 6 weeks. Telephone number and clinic information provided prior to discharge.  Patient was admitted to L+D for VAVD, Pt had an uncomplicated VAVD followed by an uncomplicated postpartum course.  EBL: 103  Hct: 31.2->27.2  On Postpartum day 2, patient was discharged home in stable condition, voiding spontaneously, pain well controlled, ambulating, tolerating PO and with normal vital signs. Patient declined postpartum birth control. Pt plans to follow up at [facility] with Dr. Zapata in 6 weeks. Telephone number and clinic information provided prior to discharge.  Patient was admitted to L+D for VAVD, Pt had an uncomplicated VAVD followed by an uncomplicated postpartum course.  EBL: 103  Hct: 31.2->27.2  On Postpartum day 2, patient was discharged home in stable condition, voiding spontaneously, pain well controlled, ambulating, tolerating PO and with normal vital signs. Patient declined postpartum birth control. Pt plans to follow up with Dr. Zapata in 6 weeks. Telephone number and clinic information provided prior to discharge.

## 2023-10-04 NOTE — DISCHARGE NOTE OB - PATIENT PORTAL LINK FT
You can access the FollowMyHealth Patient Portal offered by [facility] by registering at the following website: [email or web address]. By joining [facility]’s FollowMyHealth portal, you will also be able to view your health information using other applications (apps) compatible with our system. You can access the FollowMyHealth Patient Portal offered by [facility] by registering at the following website: [email or web address]. By joining [employer]’s FollowMyHealth portal, you will also be able to view your health information using other applications (apps) compatible with our system. You can access the FollowMyHealth Patient Portal offered by [facility] by registering at the following website: [email or web address]. By joining [facility]’s FollowMyHealth portal, you will also be able to view your health information using other applications (apps) compatible with our system.

## 2023-10-05 VITALS
DIASTOLIC BLOOD PRESSURE: 71 MMHG | OXYGEN SATURATION: 100 % | RESPIRATION RATE: 18 BRPM | HEART RATE: 90 BPM | SYSTOLIC BLOOD PRESSURE: 114 MMHG | TEMPERATURE: 98 F

## 2023-10-05 RX ORDER — IBUPROFEN 200 MG
1 TABLET ORAL
Qty: 0 | Refills: 0 | DISCHARGE
Start: 2023-10-05

## 2023-10-05 RX ADMIN — Medication 600 MILLIGRAM(S): at 12:11

## 2023-10-05 RX ADMIN — Medication 600 MILLIGRAM(S): at 06:28

## 2023-10-05 RX ADMIN — Medication 975 MILLIGRAM(S): at 03:57

## 2023-10-05 RX ADMIN — Medication 975 MILLIGRAM(S): at 03:27

## 2023-10-05 RX ADMIN — Medication 600 MILLIGRAM(S): at 00:05

## 2023-10-05 RX ADMIN — SODIUM CHLORIDE 3 MILLILITER(S): 9 INJECTION INTRAMUSCULAR; INTRAVENOUS; SUBCUTANEOUS at 05:15

## 2023-10-05 RX ADMIN — Medication 1 TABLET(S): at 11:33

## 2023-10-05 RX ADMIN — Medication 600 MILLIGRAM(S): at 11:33

## 2023-10-05 RX ADMIN — Medication 600 MILLIGRAM(S): at 00:35

## 2023-10-05 NOTE — PROGRESS NOTE ADULT - SUBJECTIVE AND OBJECTIVE BOX
Patient PPD#1 from [facility], c/b LML. Patient seen and examined at bedside, no acute overnight events. No acute complaints, pain well controlled. Patient is ambulating, voiding spontaneously, passing gas, and tolerating regular diet. Denies CP, SOB, N/V, HA, blurred vision, epigastric pain.    Vital Signs Last 24 Hours  T(C): 36.6 (10-04-23 @ 05:43), Max: 36.8 (10-03-23 @ 17:44)  HR: 61 (10-04-23 @ 05:43) (56 - 80)  BP: 104/71 (10-04-23 @ 05:43) (95/60 - 123/78)  RR: 18 (10-04-23 @ 05:43) (18 - 20)  SpO2: 100% (10-04-23 @ 05:43) (96% - 100%)    Physical Exam:  General: NAD  Abdomen: Soft, non-tender, non-distended, fundus firm  Pelvic: Lochia wnl    Labs:    Blood Type: O Negative  Antibody Screen: --  RPR: Negative               8.5    18.81 )-----------( 220      ( 10-04 @ 05:30 )             27.2                9.6    10.25 )-----------( 241      ( 10-03 @ 02:37 )             31.2         MEDICATIONS  (STANDING):  acetaminophen     Tablet .. 975 milliGRAM(s) Oral <User Schedule>  diphtheria/tetanus/pertussis (acellular) Vaccine (Adacel) 0.5 milliLiter(s) IntraMuscular once  ibuprofen  Tablet. 600 milliGRAM(s) Oral every 6 hours  influenza   Vaccine 0.5 milliLiter(s) IntraMuscular once  oxytocin Infusion 41.667 milliUNIT(s)/Min (125 mL/Hr) IV Continuous <Continuous>  prenatal multivitamin 1 Tablet(s) Oral daily  sodium chloride 0.9% lock flush 3 milliLiter(s) IV Push every 8 hours    MEDICATIONS  (PRN):  benzocaine 20%/menthol 0.5% Spray 1 Spray(s) Topical every 6 hours PRN for Perineal discomfort  dibucaine 1% Ointment 1 Application(s) Topical every 6 hours PRN Perineal discomfort  diphenhydrAMINE 25 milliGRAM(s) Oral every 6 hours PRN Pruritus  hydrocortisone 1% Cream 1 Application(s) Topical every 6 hours PRN Moderate Pain (4-6)  lanolin Ointment 1 Application(s) Topical every 6 hours PRN nipple soreness  magnesium hydroxide Suspension 30 milliLiter(s) Oral two times a day PRN Constipation  pramoxine 1%/zinc 5% Cream 1 Application(s) Topical every 4 hours PRN Moderate Pain (4-6)  simethicone 80 milliGRAM(s) Chew every 4 hours PRN Gas  witch hazel Pads 1 Application(s) Topical every 4 hours PRN Perineal discomfort  
Patient PPD#2 from [facility], c/b LML. Patient seen and examined at bedside, no acute overnight events. No acute complaints, pain well controlled. Patient is ambulating, voiding spontaneously, passing gas, and tolerating regular diet. Denies CP, SOB, N/V, HA, blurred vision, epigastric pain.    Vital Signs Last 24 Hours  T(C): 36.7 (10-04-23 @ 17:06), Max: 36.7 (10-04-23 @ 17:06)  HR: 85 (10-04-23 @ 17:06) (85 - 85)  BP: 101/60 (10-04-23 @ 17:06) (101/60 - 101/60)  RR: 18 (10-04-23 @ 17:06) (18 - 18)  SpO2: 99% (10-04-23 @ 17:06) (99% - 99%)    Physical Exam:  General: NAD  Abdomen: Soft, non-tender, non-distended, fundus firm  Pelvic: Lochia wnl    Labs:    Blood Type: O Negative  Antibody Screen: --  RPR: Negative               8.5    18.81 )-----------( 220      ( 10-04 @ 05:30 )             27.2                9.6    10.25 )-----------( 241      ( 10-03 @ 02:37 )             31.2         MEDICATIONS  (STANDING):  acetaminophen     Tablet .. 975 milliGRAM(s) Oral <User Schedule>  diphtheria/tetanus/pertussis (acellular) Vaccine (Adacel) 0.5 milliLiter(s) IntraMuscular once  ibuprofen  Tablet. 600 milliGRAM(s) Oral every 6 hours  influenza   Vaccine 0.5 milliLiter(s) IntraMuscular once  oxytocin Infusion 41.667 milliUNIT(s)/Min (125 mL/Hr) IV Continuous <Continuous>  prenatal multivitamin 1 Tablet(s) Oral daily  sodium chloride 0.9% lock flush 3 milliLiter(s) IV Push every 8 hours    MEDICATIONS  (PRN):  benzocaine 20%/menthol 0.5% Spray 1 Spray(s) Topical every 6 hours PRN for Perineal discomfort  dibucaine 1% Ointment 1 Application(s) Topical every 6 hours PRN Perineal discomfort  diphenhydrAMINE 25 milliGRAM(s) Oral every 6 hours PRN Pruritus  hydrocortisone 1% Cream 1 Application(s) Topical every 6 hours PRN Moderate Pain (4-6)  lanolin Ointment 1 Application(s) Topical every 6 hours PRN nipple soreness  magnesium hydroxide Suspension 30 milliLiter(s) Oral two times a day PRN Constipation  pramoxine 1%/zinc 5% Cream 1 Application(s) Topical every 4 hours PRN Moderate Pain (4-6)  simethicone 80 milliGRAM(s) Chew every 4 hours PRN Gas  witch hazel Pads 1 Application(s) Topical every 4 hours PRN Perineal discomfort

## 2023-10-05 NOTE — PROGRESS NOTE ADULT - PROBLEM SELECTOR PLAN 1
Patient PPD#2 from [facility], c/b LML. Vital signs are stable and physical exam is unremarkable.  - Continue with po analgesia  - Increase ambulation  - Continue regular diet  - H/H: 9.6/31.2->8.5/27.2  - Discharge planning    NELLI Ruggiero, PGY-1.
Patient PPD#1 from [facility], c/b LML. Vital signs are stable and physical exam is unremarkable.  - Continue with po analgesia  - Increase ambulation  - Continue regular diet  - H/H: 9.6/31.2->8.5/27.2    NELLI Ruggiero, PGY-1

## 2023-10-05 NOTE — PROGRESS NOTE ADULT - ATTENDING COMMENTS
Associate Chief of L & D (Late entry)     I have met this patient for the first time today.  She gets her care at Dr Zapata.  She was admitted by Dr Hodge for oligohydramnios and was delivered by Dr Zhang    OB Progress Note: VAVD PPD#1    S: 17yo  PPD#1 s/p VAVD. Patient feels well. Pain is well controlled. She is tolerating a regular diet and passing flatus. She is voiding spontaneously, and ambulating without difficulty. Denies CP/SOB. Denies lightheadedness/dizziness. Denies N/V.    O:  Vitals:  Vital Signs Last 24 Hrs  T(C): 36.6 (04 Oct 2023 05:43), Max: 36.8 (03 Oct 2023 17:44)  T(F): 97.8 (04 Oct 2023 05:43), Max: 98.2 (03 Oct 2023 17:44)  HR: 61 (04 Oct 2023 05:43) (59 - 63)  BP: 104/71 (04 Oct 2023 05:43) (95/60 - 106/57)  RR: 18 (04 Oct 2023 05:43) (18 - 20)  SpO2: 100% (04 Oct 2023 05:43) (96% - 100%)    Parameters below as of 04 Oct 2023 05:43  Patient On (Oxygen Delivery Method): room air        MEDICATIONS  (STANDING):  acetaminophen     Tablet .. 975 milliGRAM(s) Oral <User Schedule>  diphtheria/tetanus/pertussis (acellular) Vaccine (Adacel) 0.5 milliLiter(s) IntraMuscular once  ibuprofen  Tablet. 600 milliGRAM(s) Oral every 6 hours  influenza   Vaccine 0.5 milliLiter(s) IntraMuscular once  oxytocin Infusion 41.667 milliUNIT(s)/Min (125 mL/Hr) IV Continuous <Continuous>  prenatal multivitamin 1 Tablet(s) Oral daily  sodium chloride 0.9% lock flush 3 milliLiter(s) IV Push every 8 hours      Labs:  Blood type: O Negative  Rubella IgG: RPR: Negative                          8.5<L>   18.81<H> >-----------< 220    ( 10-04 @ 05:30 )             27.2<L>                        9.6<L>   10.25 >-----------< 241    ( 10-03 @ 02:37 )             31.2<L>      Physical Exam:    Abdomen: soft, non-tender, non-distended, fundus firm  Vaginal: Lochia wnl, sutures in situ   Extremities: No erythema/trace edema    A/P: 17yo PPD#1 s/p VAVD and repair of LML and lateral labial repair     - Pain well controlled, continue current pain regimen  - Increase ambulation, SCDs when not ambulating  - Continue regular diet    Meredith Myers M.D., M.B.A., M.S.
Associate Chief of L & D (Late entry)    OB Progress Note: VAVD PPD#2    S: 17yo  PPD# 2 s/p VAVD. Patient  denies CP/SOB. Denies lightheadedness/dizziness. Denies N/V.    O:  Vital Signs Last 24 Hrs  T(C): 36.4 (05 Oct 2023 06:00), Max: 36.7 (04 Oct 2023 17:06)  T(F): 97.5 (05 Oct 2023 06:00), Max: 98.1 (04 Oct 2023 17:06)  HR: 75 (05 Oct 2023 06:00) (75 - 85)  BP: 101/62 (05 Oct 2023 06:00) (101/60 - 101/62)  RR: 19 (05 Oct 2023 06:00) (18 - 19)  SpO2: 98% (05 Oct 2023 06:00) (98% - 99%)    Parameters below as of 05 Oct 2023 06:00  Patient On (Oxygen Delivery Method): room air            MEDICATIONS  (STANDING):  acetaminophen     Tablet .. 975 milliGRAM(s) Oral <User Schedule>  diphtheria/tetanus/pertussis (acellular) Vaccine (Adacel) 0.5 milliLiter(s) IntraMuscular once  ibuprofen  Tablet. 600 milliGRAM(s) Oral every 6 hours  influenza   Vaccine 0.5 milliLiter(s) IntraMuscular once  oxytocin Infusion 41.667 milliUNIT(s)/Min (125 mL/Hr) IV Continuous <Continuous>  prenatal multivitamin 1 Tablet(s) Oral daily  sodium chloride 0.9% lock flush 3 milliLiter(s) IV Push every 8 hours      Labs:  Blood type: O Negative  Rubella IgG: RPR: Negative                          8.5<L>   18.81<H> >-----------< 220    ( 10-04 @ 05:30 )             27.2<L>                        9.6<L>   10.25 >-----------< 241    ( 10-03 @ 02:37 )             31.2<L>      Physical Exam:    Abdomen: soft, non-tender, non-distended, fundus firm  Vaginal: Lochia wnl, sutures in situ   Extremities: No erythema/trace edema    A/P: 17yo PPD#2 s/p VAVD and repair of LML and lateral labial repair     - Patient is stable for discharge and willl follow up with Dr Jodi Myers M.D., M.B.A., M.S.

## 2024-07-23 NOTE — OB RN PATIENT PROFILE - NSWEIGHTCALCTOOLDRUG_GEN_A_CORE
Mom contacted [facility] and plan was accepted by PCP will be reinstated to care with [facility]     used

## 2025-01-27 NOTE — OB PROVIDER DELIVERY SUMMARY - NSASSISTEDDELIVA _OBGYN_ALL_OB
[facility]    Medicine Progress Note - Hospitalist Service    Date of Admission:  1/23/2025    Assessment & Plan   76 YO patient with PMH significant for Alzhemer's disease, anxiety disorder, hypertension, B-cell lymphoma, s/p chemotherapy who was admitted by CRS after a perineal rectosigmoidectomy for rectal prolapse    Full-thickness rectal prolapse  -Status post perineal rectosigmoidectomy 1/23  -Colorectal surgery primarily managing  -Discharge plans, analgesia, DVT prophylaxis per surgery     Hypertension  -Elevated postsurgery  -Begin IV hydralazine as needed  -Resumed home medications including Norvasc     Nausea /vomiting  -Unclear what is causing persistent nausea. Cont Zofran prn  -Allergy to Compazine, declines Benadryl, try Vistaril as a last resort for unremitting nausea  -Try to avoid narcotics if possible  -Cont protonix  -Qtc noted to be 550 today. Should use Zofran cautiously     Mild tachycardia   -No chest pain or shortness of breath  -Suspect multifactorial- pain, some dehydration, deconditioning. EKG- sinus tachy with bifascicular block (not new)  -Discussed with CRS. IVF rate increased from 50ml. Hr to 100ml/hr x 24 hours (no H/O CHF, BNP is normal). Also started metoprolol 12.5mg BID.      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 Indication: Tachyarrhythmias,  "acute (48 hours)  Code Status: Full Code      Clinically Significant Risk Factors        # Hypokalemia: Lowest K = 3 mmol/L in last 2 days, will replace as needed            # Hypertension: Noted on problem list     # Dementia: noted on problem list        # Cachexia: Estimated body mass index is 17.83 kg/m  as calculated from the following:    Height as of this encounter: 1.473 m (4' 10\").    Weight as of this encounter: 38.7 kg (85 lb 5.1 oz).             Social Drivers of Health    Physical Activity: Unknown (4/25/2024)    Exercise Vital Sign     Days of Exercise per Week: 0 days   Stress: Stress Concern Present (4/25/2024)    Citizen of [place] [employer] Occupational [employer] - Occupational Stress Questionnaire     Feeling of Stress : Rather much   Social Connections: Unknown (4/25/2024)    Social Connection and Isolation Panel [NHANES]     Frequency of Social Gatherings with Friends and Family: Once a week          Disposition Plan     Medically Ready for Discharge: Anticipated in 2-4 Days             PATRICIA Cain  Hospitalist Service  [facility]  Securely message with [employer] (more info)  Text page via [employer] Paging/Directory   ______________________________________________________________________    Interval History   Patient seen this morning. Daughter sleeping in the recliner. No acute issues overnight. Reviewed with the nursing staff and CRS colleague.       Physical Exam   Vital Signs: Temp: 97.9  F (36.6  C) Temp src: Oral BP: (!) 162/80 Pulse: (!) 113   Resp: 18 SpO2: 95 % O2 Device: None (Room air)    Weight: 85 lbs 5.09 oz      General: Not in obvious distress.  HEENT: NC,A T   Chest: Clear to auscultation bilaterally. A portacath, right upper chest wall  Heart: S1S2 normal, regular. No M/R/G  Abdomen: Soft. NT, ND. Bowel sounds- active.  Extremities: No legs swelling  Neuro: Awake, grossly non-focal      Medical Decision Making             Data     I have personally reviewed " the following data over the past 24 hrs:    12.1 (H)  \   11.1 (L)   / 313     137 99 5.6 (L) /  112 (H)   3.0 (L) 30 (H) 0.46 (L) \       Imaging results reviewed over the past 24 hrs:   No results found for this or any previous visit (from the past 24 hours).     Vacuum

## 2025-04-21 NOTE — OB RN PATIENT PROFILE - VISION (WITH CORRECTIVE LENSES IF THE PATIENT USUALLY WEARS THEM):
never used Normal vision: sees adequately in most situations; can see medication labels, newsprint Composite Graft Text: The defect edges were debeveled with a #15 scalpel blade.  Given the location of the defect, shape of the defect, the proximity to free margins and the fact the defect was full thickness a composite graft was deemed most appropriate.  The defect was outline and then transferred to the donor site.  A full thickness graft was then excised from the donor site. The graft was then placed in the primary defect, oriented appropriately and then sutured into place.  The secondary defect was then repaired using a primary closure.

## (undated) DEVICE — TUBING OLYMPUS INSUFFLATION

## (undated) DEVICE — TROCAR COVIDIEN VERSAPORT BLADELESS OPTICAL 5MM STANDARD

## (undated) DEVICE — BASIN SET SINGLE

## (undated) DEVICE — TIP METZENBAUM SCISSOR MONOPOLAR ENDOCUT (ORANGE)

## (undated) DEVICE — DRSG KERLIX ROLL 4.5"

## (undated) DEVICE — TUBING SUCTION NONCONDUCTIVE 6MM X 12FT

## (undated) DEVICE — DRSG TEGADERM 2.5X3"

## (undated) DEVICE — FOLEY TRAY 16FR 5CC LF UMETER CLOSED

## (undated) DEVICE — SUT MONOCRYL 4-0 27" PS-2 UNDYED

## (undated) DEVICE — POSITIONER STRAP ARMBOARD VELCRO TS-30

## (undated) DEVICE — TUBING INSUFFLATION LAP FILTER 10FT

## (undated) DEVICE — POSITIONER FOAM EGG CRATE ULNAR 2PCS (PINK)

## (undated) DEVICE — ENDOCATCH 10MM SPECIMEN POUCH

## (undated) DEVICE — LIGASURE MARYLAND 37CM

## (undated) DEVICE — POSITIONER PINK PAD PIGAZZI SYSTEM

## (undated) DEVICE — SUT VICRYL 0 27" UR-6

## (undated) DEVICE — VENODYNE/SCD SLEEVE CALF MEDIUM

## (undated) DEVICE — DRSG TELFA 3 X 8

## (undated) DEVICE — PACK D&C

## (undated) DEVICE — D HELP - CLEARVIEW CLEARIFY SYSTEM

## (undated) DEVICE — LIGASURE BLUNT TIP 37CM

## (undated) DEVICE — TRAP SPECIMEN SPUTUM 40CC

## (undated) DEVICE — TROCAR ETHICON ENDOPATH XCEL BLADELESS 5MM X 100MM STABILITY

## (undated) DEVICE — TROCAR COVIDIEN VERSAONE BLADED FIXATION 11MM STANDARD

## (undated) DEVICE — DRSG DERMABOND MINI

## (undated) DEVICE — DRSG STERISTRIPS 0.5 X 4"

## (undated) DEVICE — SOL IRR POUR H2O 500ML

## (undated) DEVICE — SOL IRR BAG NS 0.9% 3000ML

## (undated) DEVICE — PREP BETADINE SPONGE STICKS

## (undated) DEVICE — TROCAR COVIDIEN VERSAONE BLUNT TIP HASSAN 12MM

## (undated) DEVICE — BLADE SURGICAL #15 CARBON

## (undated) DEVICE — WARMING BLANKET FULL ADULT

## (undated) DEVICE — TROCAR APPLIED MEDICAL KII BALLOON BLUNT TIP 12MM X 130MM

## (undated) DEVICE — TUBING HYDRO-SURG PLUS IRRIGATOR W SMOKEVAC & PROBE

## (undated) DEVICE — PACK GENERAL LAPAROSCOPY

## (undated) DEVICE — UTERINE MANIPULATOR COOPER SURGICAL 5MM 33CM GREEN